# Patient Record
Sex: FEMALE | Race: WHITE | NOT HISPANIC OR LATINO | Employment: UNEMPLOYED | URBAN - NONMETROPOLITAN AREA
[De-identification: names, ages, dates, MRNs, and addresses within clinical notes are randomized per-mention and may not be internally consistent; named-entity substitution may affect disease eponyms.]

---

## 2019-03-24 ENCOUNTER — APPOINTMENT (OUTPATIENT)
Dept: GENERAL RADIOLOGY | Facility: HOSPITAL | Age: 29
End: 2019-03-24

## 2019-03-24 ENCOUNTER — HOSPITAL ENCOUNTER (EMERGENCY)
Facility: HOSPITAL | Age: 29
Discharge: HOME | End: 2019-03-24
Admitting: STUDENT IN AN ORGANIZED HEALTH CARE EDUCATION/TRAINING PROGRAM

## 2019-03-24 VITALS
DIASTOLIC BLOOD PRESSURE: 68 MMHG | OXYGEN SATURATION: 98 % | TEMPERATURE: 99.6 F | BODY MASS INDEX: 17.5 KG/M2 | RESPIRATION RATE: 18 BRPM | SYSTOLIC BLOOD PRESSURE: 107 MMHG | WEIGHT: 89.13 LBS | HEART RATE: 94 BPM | HEIGHT: 60 IN

## 2019-03-24 DIAGNOSIS — J18.9 PNEUMONIA OF RIGHT UPPER LOBE DUE TO INFECTIOUS ORGANISM: Primary | ICD-10-CM

## 2019-03-24 DIAGNOSIS — R11.2 NON-INTRACTABLE VOMITING WITH NAUSEA, UNSPECIFIED VOMITING TYPE: ICD-10-CM

## 2019-03-24 LAB
ANION GAP SERPL CALCULATED.3IONS-SCNC: 11 MMOL/L (ref 5–15)
BASOPHILS # BLD AUTO: 0.06 10*3/MM3 (ref 0–0.2)
BASOPHILS NFR BLD AUTO: 0.5 % (ref 0–1.5)
BUN BLD-MCNC: 7 MG/DL (ref 7–21)
BUN/CREAT SERPL: 14.9 (ref 7–25)
CALCIUM SPEC-SCNC: 8.9 MG/DL (ref 8.4–10.2)
CHLORIDE SERPL-SCNC: 98 MMOL/L (ref 95–110)
CO2 SERPL-SCNC: 26 MMOL/L (ref 22–31)
CREAT BLD-MCNC: 0.47 MG/DL (ref 0.5–1)
DEPRECATED RDW RBC AUTO: 36.4 FL (ref 37–54)
EOSINOPHIL # BLD AUTO: 0.03 10*3/MM3 (ref 0–0.4)
EOSINOPHIL NFR BLD AUTO: 0.3 % (ref 0.3–6.2)
ERYTHROCYTE [DISTWIDTH] IN BLOOD BY AUTOMATED COUNT: 11.6 % (ref 12.3–15.4)
GFR SERPL CREATININE-BSD FRML MDRD: 159 ML/MIN/1.73 (ref 71–165)
GLUCOSE BLD-MCNC: 87 MG/DL (ref 60–100)
HCG SERPL QL: NEGATIVE
HCT VFR BLD AUTO: 36.6 % (ref 34–46.6)
HGB BLD-MCNC: 12.9 G/DL (ref 12–15.9)
HOLD SPECIMEN: NORMAL
IMM GRANULOCYTES # BLD AUTO: 0.03 10*3/MM3 (ref 0–0.05)
IMM GRANULOCYTES NFR BLD AUTO: 0.3 % (ref 0–0.5)
LIPASE SERPL-CCNC: 50 U/L (ref 23–300)
LYMPHOCYTES # BLD AUTO: 1.24 10*3/MM3 (ref 0.7–3.1)
LYMPHOCYTES NFR BLD AUTO: 10.6 % (ref 19.6–45.3)
MCH RBC QN AUTO: 30.4 PG (ref 26.6–33)
MCHC RBC AUTO-ENTMCNC: 35.2 G/DL (ref 31.5–35.7)
MCV RBC AUTO: 86.1 FL (ref 79–97)
MONOCYTES # BLD AUTO: 1.06 10*3/MM3 (ref 0.1–0.9)
MONOCYTES NFR BLD AUTO: 9 % (ref 5–12)
NEUTROPHILS # BLD AUTO: 9.31 10*3/MM3 (ref 1.4–7)
NEUTROPHILS NFR BLD AUTO: 79.3 % (ref 42.7–76)
NRBC BLD AUTO-RTO: 0 /100 WBC (ref 0–0)
PLATELET # BLD AUTO: 190 10*3/MM3 (ref 140–450)
PMV BLD AUTO: 10.7 FL (ref 6–12)
POTASSIUM BLD-SCNC: 3.7 MMOL/L (ref 3.5–5.1)
RBC # BLD AUTO: 4.25 10*6/MM3 (ref 3.77–5.28)
SODIUM BLD-SCNC: 135 MMOL/L (ref 137–145)
WBC NRBC COR # BLD: 11.73 10*3/MM3 (ref 3.4–10.8)
WHOLE BLOOD HOLD SPECIMEN: NORMAL

## 2019-03-24 PROCEDURE — 85025 COMPLETE CBC W/AUTO DIFF WBC: CPT | Performed by: STUDENT IN AN ORGANIZED HEALTH CARE EDUCATION/TRAINING PROGRAM

## 2019-03-24 PROCEDURE — 96374 THER/PROPH/DIAG INJ IV PUSH: CPT

## 2019-03-24 PROCEDURE — 99284 EMERGENCY DEPT VISIT MOD MDM: CPT

## 2019-03-24 PROCEDURE — 25010000002 ONDANSETRON PER 1 MG: Performed by: STUDENT IN AN ORGANIZED HEALTH CARE EDUCATION/TRAINING PROGRAM

## 2019-03-24 PROCEDURE — 83690 ASSAY OF LIPASE: CPT | Performed by: STUDENT IN AN ORGANIZED HEALTH CARE EDUCATION/TRAINING PROGRAM

## 2019-03-24 PROCEDURE — 71046 X-RAY EXAM CHEST 2 VIEWS: CPT

## 2019-03-24 PROCEDURE — 84703 CHORIONIC GONADOTROPIN ASSAY: CPT | Performed by: STUDENT IN AN ORGANIZED HEALTH CARE EDUCATION/TRAINING PROGRAM

## 2019-03-24 PROCEDURE — 80048 BASIC METABOLIC PNL TOTAL CA: CPT | Performed by: STUDENT IN AN ORGANIZED HEALTH CARE EDUCATION/TRAINING PROGRAM

## 2019-03-24 RX ORDER — AZITHROMYCIN 250 MG/1
250 TABLET, FILM COATED ORAL DAILY
Qty: 4 TABLET | Refills: 0 | Status: SHIPPED | OUTPATIENT
Start: 2019-03-25 | End: 2019-03-29

## 2019-03-24 RX ORDER — SODIUM CHLORIDE 0.9 % (FLUSH) 0.9 %
10 SYRINGE (ML) INJECTION AS NEEDED
Status: DISCONTINUED | OUTPATIENT
Start: 2019-03-24 | End: 2019-03-24 | Stop reason: HOSPADM

## 2019-03-24 RX ORDER — AZITHROMYCIN 250 MG/1
500 TABLET, FILM COATED ORAL ONCE
Status: COMPLETED | OUTPATIENT
Start: 2019-03-24 | End: 2019-03-24

## 2019-03-24 RX ORDER — ONDANSETRON 2 MG/ML
4 INJECTION INTRAMUSCULAR; INTRAVENOUS ONCE
Status: COMPLETED | OUTPATIENT
Start: 2019-03-24 | End: 2019-03-24

## 2019-03-24 RX ADMIN — AZITHROMYCIN 500 MG: 250 TABLET, FILM COATED ORAL at 15:10

## 2019-03-24 RX ADMIN — ONDANSETRON 4 MG: 2 INJECTION INTRAMUSCULAR; INTRAVENOUS at 14:46

## 2019-03-24 RX ADMIN — SODIUM CHLORIDE 1000 ML: 900 INJECTION, SOLUTION INTRAVENOUS at 14:46

## 2019-06-23 ENCOUNTER — HOSPITAL ENCOUNTER (EMERGENCY)
Facility: HOSPITAL | Age: 29
Discharge: HOME | End: 2019-06-23
Attending: EMERGENCY MEDICINE | Admitting: EMERGENCY MEDICINE

## 2019-06-23 VITALS
DIASTOLIC BLOOD PRESSURE: 80 MMHG | RESPIRATION RATE: 18 BRPM | HEART RATE: 75 BPM | WEIGHT: 94.6 LBS | SYSTOLIC BLOOD PRESSURE: 124 MMHG | HEIGHT: 60 IN | BODY MASS INDEX: 18.57 KG/M2 | OXYGEN SATURATION: 100 % | TEMPERATURE: 98.6 F

## 2019-06-23 DIAGNOSIS — N93.9 VAGINAL BLEEDING: Primary | ICD-10-CM

## 2019-06-23 LAB
B-HCG UR QL: NEGATIVE
BACTERIA UR QL AUTO: ABNORMAL /HPF
BILIRUB UR QL STRIP: NEGATIVE
CLARITY UR: ABNORMAL
COLOR UR: YELLOW
GLUCOSE UR STRIP-MCNC: NEGATIVE MG/DL
HGB UR QL STRIP.AUTO: ABNORMAL
HYALINE CASTS UR QL AUTO: ABNORMAL /LPF
KETONES UR QL STRIP: NEGATIVE
LEUKOCYTE ESTERASE UR QL STRIP.AUTO: NEGATIVE
NITRITE UR QL STRIP: NEGATIVE
PH UR STRIP.AUTO: 7.5 [PH] (ref 5–9)
PROT UR QL STRIP: NEGATIVE
RBC # UR: ABNORMAL /HPF
REF LAB TEST METHOD: ABNORMAL
SP GR UR STRIP: 1.01 (ref 1–1.03)
SQUAMOUS #/AREA URNS HPF: ABNORMAL /HPF
UROBILINOGEN UR QL STRIP: ABNORMAL
WBC UR QL AUTO: ABNORMAL /HPF

## 2019-06-23 PROCEDURE — 99283 EMERGENCY DEPT VISIT LOW MDM: CPT

## 2019-06-23 PROCEDURE — 81025 URINE PREGNANCY TEST: CPT | Performed by: EMERGENCY MEDICINE

## 2019-06-23 PROCEDURE — 81001 URINALYSIS AUTO W/SCOPE: CPT | Performed by: EMERGENCY MEDICINE

## 2019-08-27 ENCOUNTER — INITIAL PRENATAL (OUTPATIENT)
Dept: OBSTETRICS AND GYNECOLOGY | Facility: CLINIC | Age: 29
End: 2019-08-27

## 2019-08-27 ENCOUNTER — APPOINTMENT (OUTPATIENT)
Dept: LAB | Facility: HOSPITAL | Age: 29
End: 2019-08-27

## 2019-08-27 DIAGNOSIS — Z32.01 PREGNANCY TEST PERFORMED, PREGNANCY CONFIRMED: Primary | ICD-10-CM

## 2019-08-27 DIAGNOSIS — O99.330 TOBACCO USE DURING PREGNANCY, ANTEPARTUM: ICD-10-CM

## 2019-08-27 DIAGNOSIS — Z34.80 PRENATAL CARE, SUBSEQUENT PREGNANCY, UNSPECIFIED TRIMESTER: ICD-10-CM

## 2019-08-27 DIAGNOSIS — Z32.00 PREGNANCY EXAMINATION OR TEST, PREGNANCY UNCONFIRMED: ICD-10-CM

## 2019-08-27 DIAGNOSIS — O21.9 NAUSEA AND VOMITING DURING PREGNANCY PRIOR TO 22 WEEKS GESTATION: ICD-10-CM

## 2019-08-27 DIAGNOSIS — Z36.87 ENCOUNTER FOR ANTENATAL SCREENING FOR UNCERTAIN DATES: ICD-10-CM

## 2019-08-27 LAB
ABO GROUP BLD: NORMAL
AMPHET+METHAMPHET UR QL: NEGATIVE
AMPHETAMINES UR QL: NEGATIVE
B-HCG UR QL: POSITIVE
BARBITURATES UR QL SCN: NEGATIVE
BASOPHILS # BLD AUTO: 0.07 10*3/MM3 (ref 0–0.2)
BASOPHILS NFR BLD AUTO: 1.1 % (ref 0–1.5)
BENZODIAZ UR QL SCN: NEGATIVE
BILIRUB UR QL STRIP: NEGATIVE
BLD GP AB SCN SERPL QL: NEGATIVE
BUPRENORPHINE SERPL-MCNC: NEGATIVE NG/ML
CANNABINOIDS SERPL QL: NEGATIVE
CLARITY UR: ABNORMAL
COCAINE UR QL: NEGATIVE
COLOR UR: YELLOW
DEPRECATED RDW RBC AUTO: 37.2 FL (ref 37–54)
EOSINOPHIL # BLD AUTO: 0.03 10*3/MM3 (ref 0–0.4)
EOSINOPHIL NFR BLD AUTO: 0.5 % (ref 0.3–6.2)
ERYTHROCYTE [DISTWIDTH] IN BLOOD BY AUTOMATED COUNT: 12.2 % (ref 12.3–15.4)
GLUCOSE UR STRIP-MCNC: NEGATIVE MG/DL
HBV SURFACE AG SERPL QL IA: NORMAL
HCT VFR BLD AUTO: 36.5 % (ref 34–46.6)
HCV AB SER DONR QL: NORMAL
HGB BLD-MCNC: 12.4 G/DL (ref 12–15.9)
HGB UR QL STRIP.AUTO: NEGATIVE
HIV1+2 AB SER QL: NORMAL
IMM GRANULOCYTES # BLD AUTO: 0.01 10*3/MM3 (ref 0–0.05)
IMM GRANULOCYTES NFR BLD AUTO: 0.2 % (ref 0–0.5)
INTERNAL NEGATIVE CONTROL: NEGATIVE
INTERNAL POSITIVE CONTROL: POSITIVE
KETONES UR QL STRIP: NEGATIVE
LEUKOCYTE ESTERASE UR QL STRIP.AUTO: NEGATIVE
LYMPHOCYTES # BLD AUTO: 1.83 10*3/MM3 (ref 0.7–3.1)
LYMPHOCYTES NFR BLD AUTO: 28.1 % (ref 19.6–45.3)
Lab: ABNORMAL
Lab: NORMAL
MCH RBC QN AUTO: 29 PG (ref 26.6–33)
MCHC RBC AUTO-ENTMCNC: 34 G/DL (ref 31.5–35.7)
MCV RBC AUTO: 85.3 FL (ref 79–97)
METHADONE UR QL SCN: NEGATIVE
MONOCYTES # BLD AUTO: 0.41 10*3/MM3 (ref 0.1–0.9)
MONOCYTES NFR BLD AUTO: 6.3 % (ref 5–12)
NEUTROPHILS # BLD AUTO: 4.16 10*3/MM3 (ref 1.7–7)
NEUTROPHILS NFR BLD AUTO: 63.8 % (ref 42.7–76)
NITRITE UR QL STRIP: NEGATIVE
NRBC BLD AUTO-RTO: 0 /100 WBC (ref 0–0.2)
OPIATES UR QL: NEGATIVE
OXYCODONE UR QL SCN: NEGATIVE
PCP UR QL SCN: NEGATIVE
PH UR STRIP.AUTO: 6.5 [PH] (ref 5–8)
PLATELET # BLD AUTO: 216 10*3/MM3 (ref 140–450)
PMV BLD AUTO: 11 FL (ref 6–12)
PROPOXYPH UR QL: NEGATIVE
PROT UR QL STRIP: NEGATIVE
RBC # BLD AUTO: 4.28 10*6/MM3 (ref 3.77–5.28)
RH BLD: POSITIVE
RPR SER QL: NORMAL
RUBV IGG SERPL IA-ACNC: POSITIVE
SP GR UR STRIP: 1.02 (ref 1–1.03)
TRICYCLICS UR QL SCN: NEGATIVE
UROBILINOGEN UR QL STRIP: ABNORMAL
WBC NRBC COR # BLD: 6.51 10*3/MM3 (ref 3.4–10.8)

## 2019-08-27 PROCEDURE — 87086 URINE CULTURE/COLONY COUNT: CPT | Performed by: NURSE PRACTITIONER

## 2019-08-27 PROCEDURE — 81003 URINALYSIS AUTO W/O SCOPE: CPT | Performed by: NURSE PRACTITIONER

## 2019-08-27 PROCEDURE — 87661 TRICHOMONAS VAGINALIS AMPLIF: CPT | Performed by: NURSE PRACTITIONER

## 2019-08-27 PROCEDURE — 0501F PRENATAL FLOW SHEET: CPT | Performed by: NURSE PRACTITIONER

## 2019-08-27 PROCEDURE — 81025 URINE PREGNANCY TEST: CPT | Performed by: NURSE PRACTITIONER

## 2019-08-27 PROCEDURE — 86803 HEPATITIS C AB TEST: CPT | Performed by: NURSE PRACTITIONER

## 2019-08-27 PROCEDURE — 36415 COLL VENOUS BLD VENIPUNCTURE: CPT | Performed by: NURSE PRACTITIONER

## 2019-08-27 PROCEDURE — 87491 CHLMYD TRACH DNA AMP PROBE: CPT | Performed by: NURSE PRACTITIONER

## 2019-08-27 PROCEDURE — 87591 N.GONORRHOEAE DNA AMP PROB: CPT | Performed by: NURSE PRACTITIONER

## 2019-08-27 PROCEDURE — 80081 OBSTETRIC PANEL INC HIV TSTG: CPT | Performed by: NURSE PRACTITIONER

## 2019-08-27 PROCEDURE — 80306 DRUG TEST PRSMV INSTRMNT: CPT | Performed by: NURSE PRACTITIONER

## 2019-08-27 RX ORDER — PRENATAL VIT/IRON FUM/FOLIC AC 27 MG-1 MG
1 TABLET ORAL DAILY
Qty: 30 EACH | Refills: 11 | Status: SHIPPED | OUTPATIENT
Start: 2019-08-27 | End: 2019-09-26

## 2019-08-27 RX ORDER — ONDANSETRON 4 MG/1
4 TABLET, FILM COATED ORAL EVERY 8 HOURS PRN
Qty: 30 TABLET | Refills: 3 | Status: SHIPPED | OUTPATIENT
Start: 2019-08-27 | End: 2021-10-06

## 2019-08-28 LAB
BACTERIA SPEC AEROBE CULT: NO GROWTH
C TRACH RRNA CVX QL NAA+PROBE: NEGATIVE
N GONORRHOEA RRNA SPEC QL NAA+PROBE: NEGATIVE
TRICHOMONAS VAGINALIS PCR: NEGATIVE

## 2019-09-02 PROBLEM — O99.330 TOBACCO USE DURING PREGNANCY, ANTEPARTUM: Status: ACTIVE | Noted: 2019-09-02

## 2019-09-02 PROBLEM — O21.9 NAUSEA AND VOMITING DURING PREGNANCY PRIOR TO 22 WEEKS GESTATION: Status: ACTIVE | Noted: 2019-09-02

## 2019-09-03 NOTE — PROGRESS NOTES
CC: NOB visit, hx reviewed     Patient Active Problem List   Diagnosis   • Tobacco use during pregnancy, antepartum   • Nausea and vomiting during pregnancy prior to 22 weeks gestation       HPI: 28 y.o. . Pt denies any chronic medical issues.  Surgical history includes wisdom teeth extraction.  Current medications prenatal vitamins.  Current everyday tobacco smoker, 0.5 ppd.  Pt denies drug use.    GA: 10w2d DELIA: 3/28/2020, by Last Menstrual Period c/w CRL dating scan on 19    ROS: +n/v.  Pt denies cramping, dysuria, or vaginal bleeding.      Labs:   No results found for: HGBA1C  Glucose   Date Value Ref Range Status   2019 87 60 - 100 mg/dL Final     Last Completed Pap Smear       Status Date      PAP SMEAR Pt declines pap today, will plan on doing 6 week pp          Radiology: bedside TAUS performed with assistance from AC Bills- single intrauterine pregnancy,  +FM,     Each of the above were reviewed and integrated into prenatal care plan.    P/E:  See Vitals flow sheet, BP 88/52, see NOB physical in episode tab    1. Routine prenatal care   Encourage PNV   SAB precautions   NOB labs   First trimester teaching completed, pt verbalized understanding.     Pt declines NIPT     2.    Diagnosis Plan   1. Pregnancy test performed, pregnancy confirmed  OB Panel With HIV    Urinalysis without microscopic (no culture) - Urine, Clean Catch    Urine Culture - Urine, Urine, Clean Catch    Urine Drug Screen - Urine, Clean Catch    Chlamydia trachomatis, Neisseria gonorrhoeae, Trichomonas vaginalis, PCR - Urine, Urine, Clean Catch   2. Pregnancy examination or test, pregnancy unconfirmed  OB Panel With HIV    Urinalysis without microscopic (no culture) - Urine, Clean Catch    Urine Culture - Urine, Urine, Clean Catch    Urine Drug Screen - Urine, Clean Catch    Chlamydia trachomatis, Neisseria gonorrhoeae, Trichomonas vaginalis, PCR - Urine, Urine, Clean Catch    Prenatal Vit-Fe Fumarate-FA  (PRENATAL/FOLIC ACID) tablet    POC Pregnancy, Urine   3. Prenatal care, subsequent pregnancy, unspecified trimester  OB Panel With HIV    Urinalysis without microscopic (no culture) - Urine, Clean Catch    Urine Culture - Urine, Urine, Clean Catch    Urine Drug Screen - Urine, Clean Catch    Chlamydia trachomatis, Neisseria gonorrhoeae, Trichomonas vaginalis, PCR - Urine, Urine, Clean Catch   4. Encounter for  screening for uncertain dates  US Ob Transvaginal  No longer indicated TAUS performed CRL c/w LMP   5. Nausea and vomiting during pregnancy prior to 22 weeks gestation  ondansetron (ZOFRAN) 4 MG tablet  Dry crackers before arising in am, small frequent meals, zofran prn    6. Tobacco use during pregnancy, antepartum  Pt educated on risks, strongly encouraged complete smoking cessation

## 2019-09-24 ENCOUNTER — ROUTINE PRENATAL (OUTPATIENT)
Dept: OBSTETRICS AND GYNECOLOGY | Facility: CLINIC | Age: 29
End: 2019-09-24

## 2019-09-24 VITALS — DIASTOLIC BLOOD PRESSURE: 58 MMHG | BODY MASS INDEX: 17.97 KG/M2 | WEIGHT: 92 LBS | SYSTOLIC BLOOD PRESSURE: 98 MMHG

## 2019-09-24 DIAGNOSIS — M25.559 PREGNANCY RELATED HIP PAIN IN FIRST TRIMESTER, ANTEPARTUM: ICD-10-CM

## 2019-09-24 DIAGNOSIS — Z36.89 ENCOUNTER FOR FETAL ANATOMIC SURVEY: ICD-10-CM

## 2019-09-24 DIAGNOSIS — O99.330 TOBACCO USE DURING PREGNANCY, ANTEPARTUM: ICD-10-CM

## 2019-09-24 DIAGNOSIS — O26.891 PREGNANCY RELATED HIP PAIN IN FIRST TRIMESTER, ANTEPARTUM: ICD-10-CM

## 2019-09-24 DIAGNOSIS — O21.9 NAUSEA AND VOMITING DURING PREGNANCY PRIOR TO 22 WEEKS GESTATION: ICD-10-CM

## 2019-09-24 DIAGNOSIS — Z86.69 HISTORY OF SCIATICA: ICD-10-CM

## 2019-09-24 DIAGNOSIS — Z64.1 MULTIGRAVIDA: ICD-10-CM

## 2019-09-24 DIAGNOSIS — Z3A.13 13 WEEKS GESTATION OF PREGNANCY: Primary | ICD-10-CM

## 2019-09-24 PROCEDURE — 0502F SUBSEQUENT PRENATAL CARE: CPT | Performed by: NURSE PRACTITIONER

## 2019-09-24 NOTE — PROGRESS NOTES
CC: Prenatal visit    Christiana Aguirre is a 28 y.o.  at 13w3d. Pt reports in previous pregnancy she had a lot if right hip and sciatica pain and is concerned this will be an issue again.  She is also concerned that her blood pressure is too low.  Educated pt on blood pressure changes in pregnancy and discussed changing positions slowing and consuming plenty of water daily.  I referred pt to Faye Buitrago, PT for evaluation.  Denies contractions, LOF, or VB.      BP 98/58   Wt 41.7 kg (92 lb)   LMP 2019   BMI 17.97 kg/m²           4th pregnancy Problems (from 19 to present)     Problem Noted Resolved    Multigravida 2019 by Samra Hayes APRN No          A/P: Christiana Aguirre is a 28 y.o.  at 13w3d.  - RTC in 1 month for LAUREEN appt and anatomy scan or sooner if needed      Diagnosis Plan   1. 13 weeks gestation of pregnancy     2. Pregnancy related hip pain in first trimester, antepartum  Ambulatory Referral to Physical Therapy Evaluate and treat   3. History of sciatica  Ambulatory Referral to Physical Therapy Evaluate and treat   4. Encounter for fetal anatomic survey  US Ob Detail Fetal Anatomy Single or First Gestation   5. Multigravida     6. Tobacco use during pregnancy, antepartum     7. Nausea and vomiting during pregnancy prior to 22 weeks gestation  Continue small frequent meals, AC Gonzalez  2019  11:27 AM

## 2019-10-29 ENCOUNTER — ROUTINE PRENATAL (OUTPATIENT)
Dept: OBSTETRICS AND GYNECOLOGY | Facility: CLINIC | Age: 29
End: 2019-10-29

## 2019-10-29 VITALS — WEIGHT: 99 LBS | DIASTOLIC BLOOD PRESSURE: 60 MMHG | SYSTOLIC BLOOD PRESSURE: 96 MMHG | BODY MASS INDEX: 19.33 KG/M2

## 2019-10-29 DIAGNOSIS — Z64.1 MULTIGRAVIDA: ICD-10-CM

## 2019-10-29 DIAGNOSIS — O09.92 HIGH-RISK PREGNANCY, SECOND TRIMESTER: ICD-10-CM

## 2019-10-29 DIAGNOSIS — O44.20 MARGINAL PLACENTA PREVIA: ICD-10-CM

## 2019-10-29 DIAGNOSIS — M25.551 PAIN OF RIGHT HIP JOINT: ICD-10-CM

## 2019-10-29 DIAGNOSIS — Z23 INFLUENZA VACCINE ADMINISTERED: Primary | ICD-10-CM

## 2019-10-29 DIAGNOSIS — M54.31 SCIATICA OF RIGHT SIDE: Primary | ICD-10-CM

## 2019-10-29 DIAGNOSIS — O43.192 MARGINAL INSERTION OF UMBILICAL CORD AFFECTING MANAGEMENT OF MOTHER IN SECOND TRIMESTER: ICD-10-CM

## 2019-10-29 DIAGNOSIS — M54.31 RIGHT SIDED SCIATICA: ICD-10-CM

## 2019-10-29 PROCEDURE — 90674 CCIIV4 VAC NO PRSV 0.5 ML IM: CPT | Performed by: NURSE PRACTITIONER

## 2019-10-29 PROCEDURE — 90471 IMMUNIZATION ADMIN: CPT | Performed by: NURSE PRACTITIONER

## 2019-10-29 PROCEDURE — 0502F SUBSEQUENT PRENATAL CARE: CPT | Performed by: NURSE PRACTITIONER

## 2019-10-29 NOTE — PROGRESS NOTES
CC: Prenatal visit    Christiana Aguirre is a 28 y.o.  at 18w3d.  Denies contractions, LOF, or VB.  Reports good FM. Has not heard back regarding PT referral. Pt c/o of right sided sciatic pain;  Right leg loses feeling daily for 10-15 seconds and is unable to put pressure on her right leg when it occurs. States she is a daily  at [employer] and pain starting to impend her work.     BP 96/60   Wt 44.9 kg (99 lb)   LMP 2019   BMI 19.33 kg/m²      Preliminary Anatomy US:  Cephalic. Placenta posterior marginal previa and marginal insertion of the cord. Marginal placenta cord insertion measures 3.0 cm from edge of placenta. Leading edge of placenta 1.5 cm from internal os. Thorax with echogenic focus.   gm/ 9 oz.  3 VC. Cervix 4.8 cm.  AFV WNL. Suboptimal views remain.     Fetal Heart Rate: 163    4th pregnancy Problems (from 19 to present)     Problem Noted Resolved    Right sided sciatica 10/29/2019 by Daisha Johnston APRN No    Overview Signed 10/29/2019  4:14 PM by Daisha Johnston APRN     Physical Therapy referral amended to Faye Buitrago DPT.          Marginal insertion of umbilical cord affecting management of mother in second trimester 10/29/2019 by Daisha Johnston APRN No    Overview Signed 10/29/2019  4:17 PM by Daisha Johnston APRN     Marginal placenta cord insertion measures 3.0 cm from edge of placenta 10/29/19         Marginal placenta previa 10/29/2019 by Daisha Johnston APRN No    Overview Signed 10/29/2019  4:18 PM by Daisha Johnston APRN     Leading edge of placenta 1.5 cm from internal os.  Counseled on pelvic rest.          High-risk pregnancy, second trimester 10/29/2019 by Daisha Johnston APRN No    Overview Addendum 10/29/2019  4:32 PM by Daisha Johnston APRN     A positive / neg antibody / Rubella Immune / GBS unknown  Dating: DELIA: 3/28/2020 by LMP c/w CRL dating scan on 19  Genetics: Declines  Tdap: 28 wks  Flu: Given  10/29  Anatomy: 10/29/19-marginal insertion of cord, posterior marginal previa, echogenic focus-thorax.  1hr Glucola: @ 28 wk  H&H/Plts: @ 28 wk  H&H/Plts: 12.4 / 36.5%/ 216 on 19.   Breastfeeding:?  BC: ?         Multigravida 2019 by Samra Hayes APRN No    Tobacco use during pregnancy, antepartum 2019 by Samra Hayes APRN No    Overview Signed 10/29/2019 10:19 AM by Daisha Johnston APRN     10 cigarettes a day; decreased from a pack a day         Nausea and vomiting during pregnancy prior to 22 weeks gestation 2019 by Samra Hayes APRN No    Overview Signed 10/29/2019  4:13 PM by Daisha Johnston APRN     Stable               A/P: Christiana Aguirre is a 28 y.o.  at 18w3d.  - RTC in 3 weeks for LAUREEN and repeat U/S.     Diagnosis Plan   1. Influenza vaccine administered  Influenza Vac Subunit Quad (FLUCELVAX) injection 0.5 mL   2. Multigravida     3. Right sided sciatica  Pt has an apt with ALFRED Mckinney 19   4. Marginal insertion of umbilical cord affecting management of mother in second trimester     5. Marginal placenta previa  Counseled on pelvic rest.    6. High-risk pregnancy, second trimester       AC Fong  10/29/2019  4:47 PM

## 2019-11-06 DIAGNOSIS — Z36.89 ENCOUNTER FOR FETAL ANATOMIC SURVEY: ICD-10-CM

## 2019-11-06 DIAGNOSIS — Z36.87 ENCOUNTER FOR ANTENATAL SCREENING FOR UNCERTAIN DATES: ICD-10-CM

## 2019-11-07 ENCOUNTER — HOSPITAL ENCOUNTER (OUTPATIENT)
Dept: PHYSICAL THERAPY | Facility: HOSPITAL | Age: 29
Setting detail: THERAPIES SERIES
Discharge: HOME | End: 2019-11-07

## 2019-11-07 DIAGNOSIS — M25.551 PAIN IN JOINT OF RIGHT HIP: ICD-10-CM

## 2019-11-07 DIAGNOSIS — M54.31 RIGHT SIDED SCIATICA: Primary | ICD-10-CM

## 2019-11-07 PROCEDURE — 97110 THERAPEUTIC EXERCISES: CPT | Performed by: PHYSICAL THERAPIST

## 2019-11-07 PROCEDURE — 97162 PT EVAL MOD COMPLEX 30 MIN: CPT | Performed by: PHYSICAL THERAPIST

## 2019-11-08 NOTE — THERAPY TREATMENT NOTE
Outpatient Physical Therapy Pelvic Health Treatment Note  [facility]     Patient Name: Christiana Aguirre  : 1990  MRN: [id number]  Today's Date: 2019        Visit Date: 2019   Visit number:   Recheck: 19      Visit Dx:    ICD-10-CM ICD-9-CM   1. Right sided sciatica M54.31 724.3   2. Pain in joint of right hip M25.551 719.45       Patient Active Problem List   Diagnosis   • Tobacco use during pregnancy, antepartum   • Nausea and vomiting during pregnancy prior to 22 weeks gestation   • Multigravida   • Right sided sciatica   • Marginal insertion of umbilical cord affecting management of mother in second trimester   • Marginal placenta previa   • High-risk pregnancy, second trimester        Pelvic Health     Row Name 19 1500             Pregnancy Questions    Number of Pregnancies  4  -SW      Number of Miscarriages  1  -SW      Has the patient had an ?  No  -SW      Number of Children  2 6 and 3 yo  -SW      How many weeks pregnant are you?  20 weeks  -SW      Type of Previous Deliveries  Vaginal  -SW      Due Date  20  -SW        User Key  (r) = Recorded By, (t) = Taken By, (c) = Cosigned By    Initials Name Provider Type    SW Faye Buitrago, PT Physical Therapist        PT Ortho     Row Name 19 1500       Subjective Comments    Subjective Comments  20 weeks gestation  with current EDC of 3/20/20.  Onset of R sciatica almost always.  Double jointed hips so seems to hurt all the time.  Feels that it pops in/out of joint all the time, even before pregnancy.  Noted pain with pregnancy of last child, but seems to hurt way earlier than last.  Pain is intermittent.  Loses feeling in leg with walking and prolonged activity.  7-8/10 pain rating.  Best 1/10.  Lifting pressure off leg decreases pain.  No scoliosis history. Some level of radicular pain all the time.  Radiation of pain to foot on R side.  Leg gives way with walking when pain hits.   Works in  [employer] at [employer] 6-8 hour work shift 5 days per week.   -SW       Subjective Pain    Able to rate subjective pain?  yes  -    Pre-Treatment Pain Level  5  -    Post-Treatment Pain Level  3  -SW       Posture/Observations    Posture- WNL  Posture is WNL  -SW    Posture/Observations Comments  Gaits into clinic without Ad.  Equal step and stride bilaterally.  R IC elevated in standing position.  L shoulder elevated.   -SW       Quarter Clearing    Quarter Clearing  Lower Quarter Clearing  -SW       DTR- Lower Quarter Clearing    Patellar tendon (L2-4)  2- Normal response  -SW    Achilles tendon (S1-2)  2- Normal response  -SW       Neural Tension Signs- Lower Quarter Clearing    Slump  Right:;Positive  -SW    SLR  Negative  -SW       Sensory Screen for Light Touch- Lower Quarter Clearing    L1 (inguinal area)  Intact  -SW    L2 (anterior mid thigh)  Intact  -SW    L3 (distal anterior thigh)  Intact  -SW    L4 (medial lower leg/foot)  Intact  -SW    L5 (lateral lower leg/great toe)  Intact  -SW    S1 (bottom of foot)  Intact  -SW       Myotomal Screen- Lower Quarter Clearing    Hip flexion (L2)  5 (Normal)  -SW    Knee extension (L3)  5 (Normal)  -SW    Knee flexion (S2)  5 (Normal)  -SW       Lumbar ROM Screen- Lower Quarter Clearing    Lumbar Flexion  -- 50 % impaired with habitus  -SW    Lumbar Extension  Normal  -SW    Lumbar Lateral Flexion  Normal  -SW    Lumbar Rotation  Normal  -SW       SI/Hip Screen- Lower Quarter Clearing    ASIS compression  Negative  -SW    ASIS distraction  Negative  -SW    Onel's/Iftikhar's test  Right:;Positive  -SW    Pain in Blank's area  Right:;Positive  -SW       Special Tests/Palpation    Special Tests/Palpation  Lumbar/SI  -SW       Lumbosacral Accessory Motions    Lumbosacral Accessory Motions Tested?  Yes  -    PA glide- Sacral base  -- R base prominent  -SW    Innominate rotation  -- R ant rotation   -SW       Lumbar/SI Special Tests    Slump Test (Neural Tension)   Right:;Positive  -SW    SI Compression Test (SI Dysfunction)  Negative  -SW    SI Distraction Test (SI Dysfunction)  Negative  -SW    SANTIAGO (hip vs. SI Dysfunction)  Right:;Positive  -SW    FAIR Test (Piriformis Syndrome)  Right:;Positive  -SW    Sacral Spring Test (SI Dysfunction)  Right:;Positive  -SW       Special Lumbosacral Questions    Are you pregnant?  Yes  -SW    Do you have pain when standing on one leg?  Yes when standing on R side  -SW    Do you have pain going up/down stairs?  Yes  -SW      User Key  (r) = Recorded By, (t) = Taken By, (c) = Cosigned By    Initials Name Provider Type    Faye Anderson, PT Physical Therapist                     PT Assessment/Plan     Row Name 11/07/19 1800          PT Assessment    Functional Limitations  Limitation in home management;Limitations in community activities;Performance in work activities;Performance in self-care ADL;Performance in leisure activities  -SW     Impairments  Impaired muscle length;Impaired postural alignment;Pain;Muscle strength;Motor function;Poor body mechanics;Posture  -SW     Assessment Comments  Doe is a 27 yo female presenting with complaints of LBP in pregnancy.  She has experienced sciatica with previous pregnancies but seems more exaccerbated with this pregnancy.  She would benefit from skilled therapy to assist with alignment, dec pain and improved function.  -SW     Rehab Potential  Good  -SW     Patient/caregiver participated in establishment of treatment plan and goals  Yes  -SW     Patient would benefit from skilled therapy intervention  Yes  -SW        PT Plan    PT Frequency  1x/week  -SW     Predicted Duration of Therapy Intervention (Therapy Eval)  15 visits  -SW     PT Plan Comments  manual therapy, thera ex, stabilization, posture and body mechanics.  -SW       User Key  (r) = Recorded By, (t) = Taken By, (c) = Cosigned By    Initials Name Provider Type    Faye Anderson, PT Physical Therapist            OP  Exercises     Row Name 19 1500             Subjective Comments    Subjective Comments  20 weeks gestation  with current EDC of 3/20/20.  Onset of R sciatica almost always.  Double jointed hips so seems to hurt all the time.  Feels that it pops in/out of joint all the time, even before pregnancy.  Noted pain with pregnancy of last child, but seems to hurt way earlier than last.  Pain is intermittent.  Loses feeling in leg with walking and prolonged activity.  7-8/10 pain rating.  Best 1/10.  Lifting pressure off leg decreases pain.  No scoliosis history. Some level of radicular pain all the time.  Radiation of pain to foot on R side.  Leg gives way with walking when pain hits.   Works in [employer] at [employer] 6-8 hour work shift 5 days per week.   -SW         Subjective Pain    Able to rate subjective pain?  yes  -SW      Pre-Treatment Pain Level  5  -SW      Post-Treatment Pain Level  3  -SW         Exercise 1    Exercise Name 1  angry cat stretch   -SW      Reps 1  10  -SW      Time 1  5 sec  -SW         Exercise 2    Exercise Name 2  piriformis stretch   -SW      Reps 2  3  -SW      Time 2  30 sec  -SW        User Key  (r) = Recorded By, (t) = Taken By, (c) = Cosigned By    Initials Name Provider Type    Faye Anderson, PT Physical Therapist           Manual Rx (last 36 hours)      Manual Treatments     Row Name 19 1500             Manual Rx 1    Manual Rx 1 Location  sacral torsion R on L  -SW        User Key  (r) = Recorded By, (t) = Taken By, (c) = Cosigned By    Initials Name Provider Type    Faye Anderson PT Physical Therapist                    PT OP Goals     Row Name 19 1800          PT Short Term Goals    STG Date to Achieve  19  -SW     STG 1  independent with HEp for ROM and stretching  -SW     STG 1 Progress  New  -SW     STG 2  patient to present with improved alignment such that MET not required  -SW     STG 2 Progress  New  -SW     STG 3  pain is mild pre/post  treatment 3/10 or less indicating improved management with use of HEP and postural training  -SW     STG 3 Progress  New  -     STG 4  patient to demo floor to waist t/f as to simulate child carry and proper  without compromise to LB mechanics.   -SW     STG 4 Progress  New  -     STG 5  dec sciatica to be more localized in buttock region and out of LE  -SW     STG 5 Progress  New  -        Long Term Goals    LTG Date to Achieve  02/28/20  -     LTG 1  full painfree ROM to LS  -SW     LTG 1 Progress  New  -     LTG 2  centralized radicular s/s to buttock and LB region  -SW     LTG 2 Progress  New  -     LTG 3  70% better subjectively since induction of PT  -SW     LTG 3 Progress  New  -        Time Calculation    PT Goal Re-Cert Due Date  12/05/19  -       User Key  (r) = Recorded By, (t) = Taken By, (c) = Cosigned By    Initials Name Provider Type    Faye Anderson, PT Physical Therapist           Therapy Education  Given: HEP  Program: New  How Provided: Verbal, Demonstration, Written  Provided to: Patient  Level of Understanding: Teach back education performed, Verbalized, Demonstrated              Time Calculation:   Start Time: 1505  Stop Time: 1559  Time Calculation (min): 54 min  Therapy Charges for Today     Code Description Service Date Service Provider Modifiers Qty    77144959074 HC PT EVAL MOD COMPLEXITY 3 11/7/2019 Faye Buitrago, PT GP 1    [id number]  PT THER PROC EA 15 MIN 11/7/2019 Faye Buitrago, PT GP 1                    Faye Buitrago PT  11/7/2019

## 2019-11-14 ENCOUNTER — HOSPITAL ENCOUNTER (OUTPATIENT)
Dept: PHYSICAL THERAPY | Facility: HOSPITAL | Age: 29
Setting detail: THERAPIES SERIES
Discharge: HOME | End: 2019-11-14

## 2019-11-14 DIAGNOSIS — M25.551 PAIN IN JOINT OF RIGHT HIP: ICD-10-CM

## 2019-11-14 DIAGNOSIS — M54.31 RIGHT SIDED SCIATICA: Primary | ICD-10-CM

## 2019-11-14 PROCEDURE — 97110 THERAPEUTIC EXERCISES: CPT | Performed by: PHYSICAL THERAPIST

## 2019-11-14 NOTE — THERAPY TREATMENT NOTE
Outpatient Physical Therapy Pelvic Health Treatment Note  [facility]     Patient Name: Christiana Aguirre  : 1990  MRN: [id number]  Today's Date: 2019        Visit Date: 2019   Visit number:   Recheck: 19      Visit Dx:    ICD-10-CM ICD-9-CM   1. Right sided sciatica M54.31 724.3   2. Pain in joint of right hip M25.551 719.45       Patient Active Problem List   Diagnosis   • Tobacco use during pregnancy, antepartum   • Nausea and vomiting during pregnancy prior to 22 weeks gestation   • Multigravida   • Right sided sciatica   • Marginal insertion of umbilical cord affecting management of mother in second trimester   • Marginal placenta previa   • High-risk pregnancy, second trimester        Pelvic Health     Row Name 19 1600             Pregnancy Questions    Number of Pregnancies  4  -SW      Number of Miscarriages  1  -SW      Has the patient had an ?  No  -SW      Number of Children  2 6 and 3 yo  -SW      How many weeks pregnant are you?  21 weeks  -SW      Type of Previous Deliveries  Vaginal  -SW      Due Date  20  -SW         Lumbar/SI Special Tests    SI Compression Test (SI Dysfunction)  Negative  -SW      SI Distraction Test (SI Dysfunction)  Negative  -SW      SANTIAGO (hip vs. SI Dysfunction)  Right:;Positive  -SW         Lumbosacral Palpation    Lumbosacral Palpation Comments  No tenderness noted with palpation to LB.  Improved alignment noted to LS though slight elevation of sacral base on the R.  Non-symptomatic at this date at sacral base and more localized pain in R buttock, though non-tender to region with touch.   -SW         Lumbosacral Accessory Motions    Lumbosacral Accessory Motions Tested?  Yes  -SW        User Key  (r) = Recorded By, (t) = Taken By, (c) = Cosigned By    Initials Name Provider Type    SW Faye Buitrago, PT Physical Therapist        PT Ortho     Row Name 19 1600       Subjective Comments    Subjective Comments   Walking on uneven floor at work causes pain in leg.  constantly moving with job and required to walk across area all the time. Feel that it challenges her pain.  Angry cat stretch is difficult because fetus is low and causes her to pee.  But the other exercise does wonders.  Really helps to loosen up.   -       Subjective Pain    Able to rate subjective pain?  yes  -    Pre-Treatment Pain Level  3  -SW    Post-Treatment Pain Level  0  -SW       Posture/Observations    Posture- WNL  Posture is WNL  -SW    Posture/Observations Comments  nonantalgic gait. Equal step and stride noted bilaterally.  No difficulty with transfers noted. Manual alignment assessment showed improved symmetry at pelvic girdle supine.  Sacrum slightly prominent on R base, but no symptomatic with palpation.   -       Neural Tension Signs- Lower Quarter Clearing    Slump  Right:;Positive  -SW       Lumbosacral Accessory Motions    PA glide- Sacral base  -- more symmetry though slightly elevated on R  -SW    Innominate rotation  -- improved symmetry noted.   -SW       Lumbar/SI Special Tests    Slump Test (Neural Tension)  Right:;Positive  -SW    Lumbar/SI Special Tests Comments  Alignment presented improved this visit.  Continued with neural tension noted with testing to RLE.   -      User Key  (r) = Recorded By, (t) = Taken By, (c) = Cosigned By    Initials Name Provider Type    Faye Anderson, PT Physical Therapist                     PT Assessment/Plan     Row Name 11/14/19 1600          PT Assessment    Functional Limitations  Limitation in home management;Limitations in community activities;Performance in work activities;Performance in self-care ADL;Performance in leisure activities  -     Impairments  Impaired muscle length;Impaired postural alignment;Pain;Muscle strength;Motor function;Poor body mechanics;Posture  -SW     Assessment Comments  Extension program created centralization of s/s without inc pain or discomfort.   Post exercise, sciatica decreased and no pain reported.  All manual therapy held this visit to assess benefit with extension program.  Patient needs stabilization exercise training to support alignment with daily functions.  Overall good progress toward goals and reduction of pain this visit.   -SW     Rehab Potential  Good  -SW     Patient/caregiver participated in establishment of treatment plan and goals  Yes  -SW     Patient would benefit from skilled therapy intervention  Yes  -SW        PT Plan    PT Frequency  1x/week  -SW     Predicted Duration of Therapy Intervention (Therapy Eval)  15 visits  -SW     PT Plan Comments  continue with advancing stabilization exercises. Manual as indicated.   -       User Key  (r) = Recorded By, (t) = Taken By, (c) = Cosigned By    Initials Name Provider Type    SW Faye Buitrago, PT Physical Therapist            OP Exercises     Row Name 11/14/19 1600             Subjective Comments    Subjective Comments  Walking on uneven floor at work causes pain in leg.  constantly moving with job and required to walk across area all the time. Feel that it challenges her pain.  Angry cat stretch is difficult because fetus is low and causes her to pee.  But the other exercise does wonders.  Really helps to loosen up.   -SW         Subjective Pain    Able to rate subjective pain?  yes  -SW      Pre-Treatment Pain Level  3  -SW      Post-Treatment Pain Level  0  -SW         Exercise 1    Exercise Name 1  hamstring stretch bilaterally  -SW      Reps 1  3  -SW      Time 1  30 sec  -SW         Exercise 2    Exercise Name 2  adductor stretch bilaterally  -SW      Reps 2  3  -SW      Time 2  30 sec  -SW         Exercise 3    Exercise Name 3  piriformis stretch bilaterally   -SW      Reps 3  3  -SW      Time 3  30 sec  -SW         Exercise 4    Exercise Name 4  prone lying position  -SW      Reps 4  2  -SW      Time 4  3 minutes  -SW      Additional Comments  subjective reports of pain dec  intensity.    -SW         Exercise 5    Exercise Name 5  prone on elbows  -SW      Reps 5  2  -SW      Time 5  3 minutes  -SW      Additional Comments  resolved pressure and pain is gone with performance of this stretch .  -SW         Exercise 6    Exercise Name 6  prone press ups.  -SW      Sets 6  2  -SW      Reps 6  5  -SW      Time 6  5 sec  -SW      Additional Comments  complete resolution of symptoms in R buttock upon completion of this exercise.    -SW         Exercise 7    Exercise Name 7  modified angry cat (pelvic rock in seated position)  -SW      Reps 7  10  -SW      Additional Comments  patient reported inc complaints of angry cat performed in quadruped due to pressure to pelvis.  Modified to seated position with good tolerance.   -SW         Exercise 8    Exercise Name 8  review of body mechanics  -SW      Additional Comments  reviewed body mechanics for child carry and work related activities.  Encouraged log roll for home use as to not exaccerbate LB s/s.   -        User Key  (r) = Recorded By, (t) = Taken By, (c) = Cosigned By    Initials Name Provider Type    Faye Anderson, PT Physical Therapist                      PT OP Goals     Row Name 11/14/19 1600          PT Short Term Goals    STG Date to Achieve  12/05/19  -     STG 1  independent with HEp for ROM and stretching  -     STG 1 Progress  Ongoing;Progressing  -     STG 2  patient to present with improved alignment such that MET not required  -     STG 2 Progress  New  -     STG 3  pain is mild pre/post treatment 3/10 or less indicating improved management with use of HEP and postural training  -     STG 3 Progress  Progressing  -     STG 4  patient to demo floor to waist t/f as to simulate child carry and proper  without compromise to LB mechanics.   -     STG 4 Progress  New  -     STG 5  dec sciatica to be more localized in buttock region and out of LE  -SW     STG 5 Progress  New  -        Long Term  Goals    LTG Date to Achieve  02/28/20  -SW     LTG 1  full painfree ROM to LS  -SW     LTG 1 Progress  New  -SW     LTG 2  centralized radicular s/s to buttock and LB region  -SW     LTG 2 Progress  New  -SW     LTG 3  70% better subjectively since induction of PT  -SW     LTG 3 Progress  New  -SW        Time Calculation    PT Goal Re-Cert Due Date  12/05/19  -       User Key  (r) = Recorded By, (t) = Taken By, (c) = Cosigned By    Initials Name Provider Type    Faye Anderson, PT Physical Therapist           Therapy Education  Given: HEP  Program: Reinforced, Progressed  How Provided: Verbal, Demonstration  Provided to: Patient  Level of Understanding: Teach back education performed, Verbalized, Demonstrated              Time Calculation:   Start Time: 1602  Stop Time: 1655  Time Calculation (min): 53 min  Therapy Charges for Today     Code Description Service Date Service Provider Modifiers Qty    81987337387 HC PT THER PROC EA 15 MIN 11/14/2019 Faye Buitrago, PT GP 4                    Faye Buitrago, PT  11/14/2019

## 2019-11-19 ENCOUNTER — ROUTINE PRENATAL (OUTPATIENT)
Dept: OBSTETRICS AND GYNECOLOGY | Facility: CLINIC | Age: 29
End: 2019-11-19

## 2019-11-19 ENCOUNTER — HOSPITAL ENCOUNTER (OUTPATIENT)
Dept: PHYSICAL THERAPY | Facility: HOSPITAL | Age: 29
Setting detail: THERAPIES SERIES
Discharge: HOME | End: 2019-11-19

## 2019-11-19 VITALS — DIASTOLIC BLOOD PRESSURE: 60 MMHG | BODY MASS INDEX: 19.53 KG/M2 | WEIGHT: 100 LBS | SYSTOLIC BLOOD PRESSURE: 100 MMHG

## 2019-11-19 DIAGNOSIS — M54.31 RIGHT SIDED SCIATICA: Primary | ICD-10-CM

## 2019-11-19 DIAGNOSIS — Z3A.21 21 WEEKS GESTATION OF PREGNANCY: Primary | ICD-10-CM

## 2019-11-19 DIAGNOSIS — M25.551 PAIN IN JOINT OF RIGHT HIP: ICD-10-CM

## 2019-11-19 DIAGNOSIS — Z64.1 MULTIGRAVIDA: ICD-10-CM

## 2019-11-19 DIAGNOSIS — M54.31 RIGHT SIDED SCIATICA: ICD-10-CM

## 2019-11-19 DIAGNOSIS — O09.92 HIGH-RISK PREGNANCY, SECOND TRIMESTER: ICD-10-CM

## 2019-11-19 DIAGNOSIS — Z36.89 ENCOUNTER FOR OTHER SPECIFIED ANTENATAL SCREENING: ICD-10-CM

## 2019-11-19 DIAGNOSIS — O99.330 TOBACCO USE DURING PREGNANCY, ANTEPARTUM: ICD-10-CM

## 2019-11-19 PROCEDURE — 0502F SUBSEQUENT PRENATAL CARE: CPT | Performed by: NURSE PRACTITIONER

## 2019-11-19 PROCEDURE — 97110 THERAPEUTIC EXERCISES: CPT | Performed by: PHYSICAL THERAPIST

## 2019-11-19 NOTE — PROGRESS NOTES
CC: Prenatal visit    Christiana Aguirre is a 28 y.o.  at 21w3d by LMP.  Doing well.  No complaints.  Denies contractions, LOF, or VB.  Reports good FM. States sciatica pain has improved greatly with PT. States she has cut down to 6 cigarettes/ day from a whole pack this pregnancy.     /60   Wt 45.4 kg (100 lb)   LMP 2019   BMI 19.53 kg/m²     Fundal Height (cm): 21 cm  Fetal Heart Rate: 145 us   Preliminary OB repeat US:  AFV wnl; cephalic, posterior placenta; NO PREVIA & P. Cord insertion NORMAL APPEARANCE.  gm (1lb1oz), Remaining anatomy views normal in appearance, no echogenic focuses see today in 4ch. Cervix 3.61 cm. Reviewed findings with patient.     4th pregnancy Problems (from 19 to present)     Problem Noted Resolved    Right sided sciatica 10/29/2019 by Daisha Johnston APRN No    Overview Signed 10/29/2019  4:14 PM by Daisha Johnston APRN     Physical Therapy referral amended to Faye Buitrago DPT.          Marginal insertion of umbilical cord affecting management of mother in second trimester 10/29/2019 by Daisha Johnston APRN No    Overview Addendum 2019 10:06 AM by Daisha Johnston APRN     Resolved on US 2019         Marginal placenta previa 10/29/2019 by Daisha Johnston APRN No    Overview Addendum 2019 10:05 AM by Daisha Johnston APRN     Resolved; posterior placenta, no previa and cord insertion wnl on U/S on 2019             High-risk pregnancy, second trimester 10/29/2019 by Daisha Johnston APRN No    Overview Addendum 10/29/2019  4:32 PM by Daisha Johnston APRN     A positive / neg antibody / Rubella Immune / GBS unknown  Dating: DELIA: 3/28/2020 by LMP c/w CRL dating scan on 19  Genetics: Declines  Tdap: 28 wks  Flu: Given 10/29  Anatomy: 10/29/19-marginal insertion of cord, posterior marginal previa, echogenic focus-thorax.  1hr Glucola: @ 28 wk  H&H/Plts: @ 28 wk  H&H/Plts: 12.4 / 36.5%/ 216 on 19.    Breastfeeding:?  BC: ?         Multigravida 2019 by Samra Hayes APRN No    Tobacco use during pregnancy, antepartum 2019 by Samra Hayes APRN No    Overview Addendum 2019  9:52 AM by Daisha Johnston APRN     6 cigarettes a day; decreased from a pack a day;         Nausea and vomiting during pregnancy prior to 22 weeks gestation 2019 by Samra Hayes APRN No    Overview Signed 10/29/2019  4:13 PM by Daisha Johnston APRN     Stable               A/P: Christiana Aguirre is a 28 y.o.  at 21w3d.  -Glucola and CBC at next visit  -RTC in 4 weeks     Diagnosis Plan   1. 21 weeks gestation of pregnancy     2. Right sided sciatica     3. High-risk pregnancy, second trimester     4. Multigravida     5. Tobacco use during pregnancy, antepartum     6. Encounter for other specified  screening  Glucose, Post 50 Gm Glucola    CBC (No Diff)     AC Fong  2019  10:13 AM

## 2019-11-20 NOTE — THERAPY TREATMENT NOTE
Outpatient Physical Therapy Pelvic Health Treatment Note  [facility]     Patient Name: Christiana Aguirre  : 1990  MRN: [id number]  Today's Date: 2019        Visit Date: 2019  Visit Number: 3/3  Recheck: 19    Visit Dx:    ICD-10-CM ICD-9-CM   1. Right sided sciatica M54.31 724.3   2. Pain in joint of right hip M25.551 719.45       Patient Active Problem List   Diagnosis   • Tobacco use during pregnancy, antepartum   • Nausea and vomiting during pregnancy prior to 22 weeks gestation   • Multigravida   • Right sided sciatica   • Marginal insertion of umbilical cord affecting management of mother in second trimester   • Marginal placenta previa   • High-risk pregnancy, second trimester        Pelvic Health     Row Name 19 1000             Pregnancy Questions    Number of Pregnancies  4  -SW      Number of Miscarriages  1  -SW      Has the patient had an ?  No  -SW      Number of Children  2  -SW      How many weeks pregnant are you?  22 weeks  -SW      Type of Previous Deliveries  Vaginal  -SW      Due Date  19  -SW         Pain Assessment    Pain Assessment  No/denies pain  -SW         Lumbosacral Palpation    Lumbosacral Palpation Comments  Unremarkable exam this visit.   -SW        User Key  (r) = Recorded By, (t) = Taken By, (c) = Cosigned By    Initials Name Provider Type    Faye Anderson, PT Physical Therapist        PT Ortho     Row Name 19 1000       Subjective Comments    Subjective Comments  Has been to MD this morning.  Got another US this morning.  Femur bones of fetus are going to be long.  MD FU on  for glucose test.  Sciatica is good today.  No radicular s/s at all today.  Started a little on Thursday evening at work but has subsided since then. Really feeling good and feels that exercises are helping.   -SW       Subjective Pain    Able to rate subjective pain?  yes  -SW    Pre-Treatment Pain Level  0  -SW    Post-Treatment Pain  Level  0  -SW       Posture/Observations    Posture- WNL  Posture is WNL  -SW    Posture/Observations Comments  Gait without antalgia; good alignment this visit in supine.  Equal alignment noted bilaterally. Neg SLR and slump test noted.   -SW       Neural Tension Signs- Lower Quarter Clearing    Slump  Negative  -SW    SLR  Negative  -SW       Lumbar ROM Screen- Lower Quarter Clearing    Lumbar Flexion  Normal  -SW       Lumbosacral Accessory Motions    Lumbosacral Accessory Motions Tested?  Yes  -    PA glide- Sacral base  -- improved symmetry noted.   -    Innominate rotation  -- equal alignment noted this date.   -SW       Lumbar/SI Special Tests    SI Compression Test (SI Dysfunction)  Negative  -SW    SI Distraction Test (SI Dysfunction)  Negative  -SW    SANTIAGO (hip vs. SI Dysfunction)  Right:;Positive  -    Lumbar/SI Special Tests Comments  Overall alignment position good without asymmetry noted.  Improved stabilization noted with seated activity.  Prone shows good activation.  Neural tension tests negative this date.   -      User Key  (r) = Recorded By, (t) = Taken By, (c) = Cosigned By    Initials Name Provider Type    Faye Anderson, PT Physical Therapist                     PT Assessment/Plan     Row Name 11/19/19 1000          PT Assessment    Functional Limitations  Limitation in home management;Limitations in community activities;Performance in work activities;Performance in self-care ADL;Performance in leisure activities  -     Impairments  Impaired muscle length;Impaired postural alignment;Pain;Muscle strength;Motor function;Poor body mechanics;Posture  -     Assessment Comments  Patient showing reduced pain and overall improved alignment and symmetry with visit.  No neural tension noted with exam. Improved daily functions with less disability. STG 1 and 3 met thus far.  Good progression with achievement.   -     Rehab Potential  Good  -     Patient/caregiver participated in  establishment of treatment plan and goals  Yes  -SW     Patient would benefit from skilled therapy intervention  Yes  -SW        PT Plan    PT Frequency  1x/week  -SW     Predicted Duration of Therapy Intervention (Therapy Eval)  15 visits  -SW     PT Plan Comments  cont with prone extension program.  Begin stabilization in seated and standing with TB as able.   -SW       User Key  (r) = Recorded By, (t) = Taken By, (c) = Cosigned By    Initials Name Provider Type    SW Faye Buitrago, PT Physical Therapist            OP Exercises     Row Name 11/19/19 1000             Subjective Comments    Subjective Comments  Has been to MD this morning.  Got another US this morning.  Femur bones of fetus are going to be long.  MD FU on 12/17 for glucose test.  Sciatica is good today.  No radicular s/s at all today.  Started a little on Thursday evening at work but has subsided since then. Really feeling good and feels that exercises are helping.   -SW         Subjective Pain    Able to rate subjective pain?  yes  -SW      Pre-Treatment Pain Level  0  -SW      Post-Treatment Pain Level  0  -SW         Exercise 1    Exercise Name 1  hamstring stretch bilaterally   -SW      Reps 1  2  -SW      Time 1  30 sec  -SW         Exercise 2    Exercise Name 2  adductor stretch bilaterally   -SW      Reps 2  2   -SW      Time 2  30 sec  -SW         Exercise 3    Exercise Name 3  piriformis stretch   -SW      Reps 3  2  -SW      Time 3  30 sec  -SW         Exercise 4    Exercise Name 4  physioball lateral tilts  -SW      Reps 4  2  -SW      Time 4  1 min  -SW      Additional Comments  post tilts inc pain to LB R side.   -SW         Exercise 5    Exercise Name 5  physioball circles.   -SW      Reps 5  2  -SW      Time 5  1 min  -SW         Exercise 6    Exercise Name 6  prone lying position  -SW      Time 6  3 minutes  -SW         Exercise 7    Exercise Name 7  prone on elbows  -SW      Reps 7  2  -SW      Time 7  3 minutes  -SW          Exercise 8    Exercise Name 8  prone press ups  -SW      Sets 8  2  -SW      Reps 8  5  -SW      Time 8  5 sec  -SW         Exercise 9    Exercise Name 9  angry cat stretch   -SW      Reps 9  10  -SW      Time 9  5 sec  -SW         Exercise 10    Exercise Name 10  seated brace to pelvic girdle  -SW      Time 10  6 min  -SW      Additional Comments  isolated contractions of PF and TA and multifidus with co-activation as well.   -SW        User Key  (r) = Recorded By, (t) = Taken By, (c) = Cosigned By    Initials Name Provider Type    Faye Anderson, PT Physical Therapist                      PT OP Goals     Row Name 11/19/19 1000          PT Short Term Goals    STG Date to Achieve  12/05/19  -SW     STG 1  independent with HEp for ROM and stretching  -SW     STG 1 Progress  Met  -SW     STG 2  patient to present with improved alignment such that MET not required  -SW     STG 2 Progress  Ongoing;Progressing  -SW     STG 3  pain is mild pre/post treatment 3/10 or less indicating improved management with use of HEP and postural training  -SW     STG 3 Progress  Met  -SW     STG 4  patient to demo floor to waist t/f as to simulate child carry and proper  without compromise to LB mechanics.   -SW     STG 4 Progress  New  -SW     STG 5  dec sciatica to be more localized in buttock region and out of LE  -SW     STG 5 Progress  Progressing  -SW        Long Term Goals    LTG Date to Achieve  02/28/20  -SW     LTG 1  full painfree ROM to LS  -SW     LTG 1 Progress  New  -SW     LTG 2  centralized radicular s/s to buttock and LB region  -SW     LTG 2 Progress  New  -SW     LTG 3  70% better subjectively since induction of PT  -SW     LTG 3 Progress  New  -SW        Time Calculation    PT Goal Re-Cert Due Date  12/05/19  -       User Key  (r) = Recorded By, (t) = Taken By, (c) = Cosigned By    Initials Name Provider Type    Faye Anderson PT Physical Therapist           Therapy Education  Given:  HEP  Program: Reinforced, Progressed  How Provided: Verbal, Demonstration, Written  Provided to: Patient  Level of Understanding: Teach back education performed, Verbalized, Demonstrated              Time Calculation:   Start Time: 1015  Stop Time: 1118  Time Calculation (min): 63 min  Therapy Charges for Today     Code Description Service Date Service Provider Modifiers Qty    10664815591 HC PT THER PROC EA 15 MIN 11/19/2019 Faye Buitrago, PT GP 4                    Faye Buitrago, PT  11/19/2019

## 2019-11-26 DIAGNOSIS — O99.332 TOBACCO SMOKING COMPLICATING PREGNANCY IN SECOND TRIMESTER: Primary | ICD-10-CM

## 2019-11-26 DIAGNOSIS — Z36.2 OTHER ANTENATAL SCREENING BASED ON AMNIOCENTESIS: ICD-10-CM

## 2019-11-26 DIAGNOSIS — Z3A.21 21 WEEKS GESTATION OF PREGNANCY: ICD-10-CM

## 2019-12-02 DIAGNOSIS — Z36.2 OTHER ANTENATAL SCREENING BASED ON AMNIOCENTESIS: ICD-10-CM

## 2019-12-02 DIAGNOSIS — O99.332 TOBACCO SMOKING COMPLICATING PREGNANCY IN SECOND TRIMESTER: ICD-10-CM

## 2019-12-02 DIAGNOSIS — Z3A.21 21 WEEKS GESTATION OF PREGNANCY: ICD-10-CM

## 2019-12-03 ENCOUNTER — APPOINTMENT (OUTPATIENT)
Dept: PHYSICAL THERAPY | Facility: HOSPITAL | Age: 29
End: 2019-12-03

## 2019-12-17 ENCOUNTER — ROUTINE PRENATAL (OUTPATIENT)
Dept: OBSTETRICS AND GYNECOLOGY | Facility: CLINIC | Age: 29
End: 2019-12-17

## 2019-12-17 ENCOUNTER — APPOINTMENT (OUTPATIENT)
Dept: LAB | Facility: HOSPITAL | Age: 29
End: 2019-12-17

## 2019-12-17 VITALS — BODY MASS INDEX: 19.84 KG/M2 | SYSTOLIC BLOOD PRESSURE: 98 MMHG | DIASTOLIC BLOOD PRESSURE: 58 MMHG | WEIGHT: 101.6 LBS

## 2019-12-17 DIAGNOSIS — M54.31 RIGHT SIDED SCIATICA: ICD-10-CM

## 2019-12-17 DIAGNOSIS — Z3A.25 25 WEEKS GESTATION OF PREGNANCY: Primary | ICD-10-CM

## 2019-12-17 DIAGNOSIS — O43.192 MARGINAL INSERTION OF UMBILICAL CORD AFFECTING MANAGEMENT OF MOTHER IN SECOND TRIMESTER: ICD-10-CM

## 2019-12-17 DIAGNOSIS — Z36.89 ENCOUNTER FOR OTHER SPECIFIED ANTENATAL SCREENING: ICD-10-CM

## 2019-12-17 DIAGNOSIS — O99.330 TOBACCO USE DURING PREGNANCY, ANTEPARTUM: ICD-10-CM

## 2019-12-17 DIAGNOSIS — O09.92 HIGH-RISK PREGNANCY, SECOND TRIMESTER: ICD-10-CM

## 2019-12-17 DIAGNOSIS — Z64.1 MULTIGRAVIDA: ICD-10-CM

## 2019-12-17 DIAGNOSIS — O21.9 NAUSEA AND VOMITING DURING PREGNANCY PRIOR TO 22 WEEKS GESTATION: ICD-10-CM

## 2019-12-17 PROBLEM — O44.20 MARGINAL PLACENTA PREVIA: Status: RESOLVED | Noted: 2019-10-29 | Resolved: 2019-12-17

## 2019-12-17 LAB
DEPRECATED RDW RBC AUTO: 41.1 FL (ref 37–54)
ERYTHROCYTE [DISTWIDTH] IN BLOOD BY AUTOMATED COUNT: 12 % (ref 12.3–15.4)
GLUCOSE 1H P 100 G GLC PO SERPL-MCNC: 125 MG/DL (ref 60–140)
HCT VFR BLD AUTO: 30.3 % (ref 34–46.6)
HGB BLD-MCNC: 10.4 G/DL (ref 12–15.9)
MCH RBC QN AUTO: 31.9 PG (ref 26.6–33)
MCHC RBC AUTO-ENTMCNC: 34.3 G/DL (ref 31.5–35.7)
MCV RBC AUTO: 92.9 FL (ref 79–97)
PLATELET # BLD AUTO: 172 10*3/MM3 (ref 140–450)
PMV BLD AUTO: 10.3 FL (ref 6–12)
RBC # BLD AUTO: 3.26 10*6/MM3 (ref 3.77–5.28)
WBC NRBC COR # BLD: 9.32 10*3/MM3 (ref 3.4–10.8)

## 2019-12-17 PROCEDURE — 82950 GLUCOSE TEST: CPT | Performed by: NURSE PRACTITIONER

## 2019-12-17 PROCEDURE — 85027 COMPLETE CBC AUTOMATED: CPT | Performed by: NURSE PRACTITIONER

## 2019-12-17 PROCEDURE — 0502F SUBSEQUENT PRENATAL CARE: CPT | Performed by: OBSTETRICS & GYNECOLOGY

## 2019-12-17 PROCEDURE — 36415 COLL VENOUS BLD VENIPUNCTURE: CPT | Performed by: OBSTETRICS & GYNECOLOGY

## 2019-12-17 RX ORDER — DOCUSATE SODIUM 100 MG/1
100 CAPSULE, LIQUID FILLED ORAL 2 TIMES DAILY
Qty: 60 CAPSULE | Refills: 1 | Status: SHIPPED | OUTPATIENT
Start: 2019-12-17 | End: 2021-10-06

## 2019-12-17 RX ORDER — FERROUS SULFATE 325(65) MG
325 TABLET ORAL
Qty: 90 TABLET | Refills: 4 | Status: SHIPPED | OUTPATIENT
Start: 2019-12-17 | End: 2021-10-06

## 2019-12-17 NOTE — PROGRESS NOTES
CC: Prenatal visit    Christiana Aguirre is a 28 y.o.  at 25w3d.  Doing well.  No complaints.  Denies contractions, LOF, or VB.  Reports good FM.    BP 98/58   Wt 46.1 kg (101 lb 9.6 oz)   LMP 2019   BMI 19.84 kg/m²     Fundal Height (cm): 25 cm  Fetal Heart Rate: 160    4th pregnancy Problems (from 19 to present)     Problem Noted Resolved    Right sided sciatica 10/29/2019 by Daisha Johnston APRN No    Overview Signed 10/29/2019  4:14 PM by Daisha Johnston APRN     Physical Therapy referral amended to Faye Buitrago DPT.          Marginal insertion of umbilical cord affecting management of mother in second trimester 10/29/2019 by Daisha Johnston APRN No    Overview Addendum 2019 10:06 AM by Daisha Johnston APRN     Resolved on US 2019         High-risk pregnancy, second trimester 10/29/2019 by Daisha Johnston APRN No    Overview Addendum 10/29/2019  4:32 PM by Daisha Johnston APRN     A positive / neg antibody / Rubella Immune / GBS unknown  Dating: DELIA: 3/28/2020 by LMP c/w CRL dating scan on 19  Genetics: Declines  Tdap: 28 wks  Flu: Given 10/29  Anatomy: 10/29/19-marginal insertion of cord, posterior marginal previa, echogenic focus-thorax.  1hr Glucola: @ 28 wk  H&H/Plts: @ 28 wk  H&H/Plts: 12.4 / 36.5%/ 216 on 19.   Breastfeeding:?  BC: ?         Multigravida 2019 by Samra Hayes APRN No    Tobacco use during pregnancy, antepartum 2019 by Samra Hayes APRN No    Overview Addendum 2019  9:52 AM by Daisha Johnston APRN     6 cigarettes a day; decreased from a pack a day;         Nausea and vomiting during pregnancy prior to 22 weeks gestation 2019 by Samra Hayes APRN No    Overview Signed 10/29/2019  4:13 PM by aDisha Johnston, AC     Stable         Marginal placenta previa 10/29/2019 by Daisha Johnston APRN 2019 by Van Cordova DO    Overview Addendum  2019 10:05 AM by Daisha Johnston APRN     Resolved; posterior placenta, no previa and cord insertion wnl on U/S on 2019                   A/P: Christiana Aguirre is a 28 y.o.  at 25w3d.  Doing well with appropriately progressing pregnancy at this time.  Patient to do Glucola today.  Follow-up in 3 weeks, sooner as needed.  Bleeding and cramping precautions given.  - RTC in 3 weeks     Diagnosis Plan   1. 25 weeks gestation of pregnancy     2. Right sided sciatica     3. Marginal insertion of umbilical cord affecting management of mother in second trimester     4. High-risk pregnancy, second trimester     5. Multigravida     6. Tobacco use during pregnancy, antepartum     7. Nausea and vomiting during pregnancy prior to 22 weeks gestation       Van Cordova DO  2019  10:23 AM

## 2020-01-07 ENCOUNTER — ROUTINE PRENATAL (OUTPATIENT)
Dept: OBSTETRICS AND GYNECOLOGY | Facility: CLINIC | Age: 30
End: 2020-01-07

## 2020-01-07 VITALS — SYSTOLIC BLOOD PRESSURE: 97 MMHG | BODY MASS INDEX: 21.29 KG/M2 | DIASTOLIC BLOOD PRESSURE: 60 MMHG | WEIGHT: 109 LBS

## 2020-01-07 DIAGNOSIS — Z3A.28 28 WEEKS GESTATION OF PREGNANCY: Primary | ICD-10-CM

## 2020-01-07 DIAGNOSIS — O43.192 MARGINAL INSERTION OF UMBILICAL CORD AFFECTING MANAGEMENT OF MOTHER IN SECOND TRIMESTER: ICD-10-CM

## 2020-01-07 DIAGNOSIS — M54.31 RIGHT SIDED SCIATICA: ICD-10-CM

## 2020-01-07 DIAGNOSIS — Z64.1 MULTIGRAVIDA: ICD-10-CM

## 2020-01-07 DIAGNOSIS — O99.330 TOBACCO USE DURING PREGNANCY, ANTEPARTUM: ICD-10-CM

## 2020-01-07 DIAGNOSIS — O21.9 NAUSEA AND VOMITING DURING PREGNANCY PRIOR TO 22 WEEKS GESTATION: ICD-10-CM

## 2020-01-07 DIAGNOSIS — O09.92 HIGH-RISK PREGNANCY, SECOND TRIMESTER: ICD-10-CM

## 2020-01-07 PROCEDURE — 0502F SUBSEQUENT PRENATAL CARE: CPT | Performed by: OBSTETRICS & GYNECOLOGY

## 2020-01-07 PROCEDURE — 90715 TDAP VACCINE 7 YRS/> IM: CPT | Performed by: OBSTETRICS & GYNECOLOGY

## 2020-01-07 PROCEDURE — 90471 IMMUNIZATION ADMIN: CPT | Performed by: OBSTETRICS & GYNECOLOGY

## 2020-01-07 NOTE — PROGRESS NOTES
CC: Prenatal visit    Christiana Aguirre is a 29 y.o.  at 28w3d.  Doing well.  No complaints.  Denies contractions, LOF, or VB.  Reports good FM.    BP 97/60   Wt 49.4 kg (109 lb)   LMP 2019   BMI 21.29 kg/m²     Fundal Height (cm): 28 cm  Fetal Heart Rate: 155    4th pregnancy Problems (from 19 to present)     Problem Noted Resolved    Right sided sciatica 10/29/2019 by Daisha Johnston APRN No    Overview Signed 10/29/2019  4:14 PM by Daisha Johnston APRN     Physical Therapy referral amended to Faye Buitrago DPT.          Marginal insertion of umbilical cord affecting management of mother in second trimester 10/29/2019 by Daisha Johnston APRN No    Overview Addendum 2019 10:06 AM by Daisha Johnston APRN     Resolved on US 2019         High-risk pregnancy, second trimester 10/29/2019 by Daisha Johnston APRN No    Overview Addendum 10/29/2019  4:32 PM by Daisha Johnston APRN     A positive / neg antibody / Rubella Immune / GBS unknown  Dating: DELIA: 3/28/2020 by LMP c/w CRL dating scan on 19  Genetics: Declines  Tdap: 28 wks  Flu: Given 10/29  Anatomy: 10/29/19-marginal insertion of cord, posterior marginal previa, echogenic focus-thorax.  1hr Glucola: @ 28 wk  H&H/Plts: @ 28 wk  H&H/Plts: 12.4 / 36.5%/ 216 on 19.   Breastfeeding:?  BC: ?         Multigravida 2019 by Samra Hayes APRN No    Tobacco use during pregnancy, antepartum 2019 by Samra Hayes APRN No    Overview Addendum 2019  9:52 AM by Daisha Johnston APRN     6 cigarettes a day; decreased from a pack a day;         Nausea and vomiting during pregnancy prior to 22 weeks gestation 2019 by Samra Hayes APRN No    Overview Signed 10/29/2019  4:13 PM by Daisha Johnston APRN     Stable         Marginal placenta previa 10/29/2019 by Daisha Johnston APRN 2019 by Van Cordova DO    Overview Addendum  2019 10:05 AM by Daisha Johnston APRN     Resolved; posterior placenta, no previa and cord insertion wnl on U/S on 2019                   A/P: Christiana Aguirre is a 29 y.o.  at 28w3d.  Doing well with appropriately progressing pregnancy.  Patient is taking iron and Colace for anemia.  Patient to get Tdap today.  Has received flu shot.  No concerns or questions.  Fetal kick count and  labor precautions given.  Reviewed Glucola with patient which was normal.  - RTC in 4 weeks     Diagnosis Plan   1. 28 weeks gestation of pregnancy     2. Right sided sciatica     3. Marginal insertion of umbilical cord affecting management of mother in second trimester     4. High-risk pregnancy, second trimester     5. Multigravida     6. Tobacco use during pregnancy, antepartum     7. Nausea and vomiting during pregnancy prior to 22 weeks gestation       Van Cordova DO  2020  9:56 AM

## 2020-02-04 ENCOUNTER — ROUTINE PRENATAL (OUTPATIENT)
Dept: OBSTETRICS AND GYNECOLOGY | Facility: CLINIC | Age: 30
End: 2020-02-04

## 2020-02-04 VITALS — SYSTOLIC BLOOD PRESSURE: 97 MMHG | BODY MASS INDEX: 20.51 KG/M2 | DIASTOLIC BLOOD PRESSURE: 58 MMHG | WEIGHT: 105 LBS

## 2020-02-04 DIAGNOSIS — O99.330 TOBACCO USE DURING PREGNANCY, ANTEPARTUM: ICD-10-CM

## 2020-02-04 DIAGNOSIS — O09.92 HIGH-RISK PREGNANCY, SECOND TRIMESTER: ICD-10-CM

## 2020-02-04 DIAGNOSIS — O43.192 MARGINAL INSERTION OF UMBILICAL CORD AFFECTING MANAGEMENT OF MOTHER IN SECOND TRIMESTER: ICD-10-CM

## 2020-02-04 DIAGNOSIS — M54.31 RIGHT SIDED SCIATICA: ICD-10-CM

## 2020-02-04 DIAGNOSIS — Z64.1 MULTIGRAVIDA: ICD-10-CM

## 2020-02-04 PROCEDURE — 0502F SUBSEQUENT PRENATAL CARE: CPT | Performed by: OBSTETRICS & GYNECOLOGY

## 2020-02-04 NOTE — PROGRESS NOTES
CC: Prenatal visit    Christiana Aguirre is a 29 y.o.  at 32w3d.  Doing well.  No complaints.  Denies contractions, LOF, or VB.  Reports good FM.    BP 97/58   Wt 47.6 kg (105 lb)   LMP 2019   BMI 20.51 kg/m²     Fundal Height (cm): 32 cm  Fetal Heart Rate: 140    4th pregnancy Problems (from 19 to present)     Problem Noted Resolved    Right sided sciatica 10/29/2019 by Daisha Johnston APRN No    Overview Signed 10/29/2019  4:14 PM by Daisha Johnston APRN     Physical Therapy referral amended to Faye Buitrago DPT.          Marginal insertion of umbilical cord affecting management of mother in second trimester 10/29/2019 by Daisha Johnston APRN No    Overview Addendum 2019 10:06 AM by Daisha Johnston APRN     Resolved on US 2019         High-risk pregnancy, second trimester 10/29/2019 by Daisha Johnston APRN No    Overview Addendum 10/29/2019  4:32 PM by Daisha Johnston APRN     A positive / neg antibody / Rubella Immune / GBS unknown  Dating: DELIA: 3/28/2020 by LMP c/w CRL dating scan on 19  Genetics: Declines  Tdap: 28 wks  Flu: Given 10/29  Anatomy: 10/29/19-marginal insertion of cord, posterior marginal previa, echogenic focus-thorax.  1hr Glucola: @ 28 wk  H&H/Plts: @ 28 wk  H&H/Plts: 12.4 / 36.5%/ 216 on 19.   Breastfeeding:?  BC: ?         Multigravida 2019 by Samra Hayes APRN No    Tobacco use during pregnancy, antepartum 2019 by Samra Hayes APRN No    Overview Addendum 2019  9:52 AM by Daisha Johnston APRN     6 cigarettes a day; decreased from a pack a day;         Nausea and vomiting during pregnancy prior to 22 weeks gestation 2019 by Samra Hayes APRN No    Overview Signed 10/29/2019  4:13 PM by Daisha Johnston APRN     Stable         Marginal placenta previa 10/29/2019 by Daisha Johnston APRN 2019 by Van Cordova DO    Overview Addendum  2019 10:05 AM by Daisha Johnston APRN     Resolved; posterior placenta, no previa and cord insertion wnl on U/S on 2019                   A/P: Christiana Aguirre is a 29 y.o.  at 32w3d.  Doing well with appropriately progressing pregnancy at this time.  Patient to follow-up with me in 2 weeks, sooner as needed.  Fetal kick count and  labor precautions given.  - RTC in 2 weeks     Diagnosis Plan   1. Right sided sciatica     2. Marginal insertion of umbilical cord affecting management of mother in second trimester     3. High-risk pregnancy, second trimester     4. Multigravida     5. Tobacco use during pregnancy, antepartum       Van Cordova,   2020  10:00 AM

## 2020-02-18 ENCOUNTER — ROUTINE PRENATAL (OUTPATIENT)
Dept: OBSTETRICS AND GYNECOLOGY | Facility: CLINIC | Age: 30
End: 2020-02-18

## 2020-02-18 VITALS — SYSTOLIC BLOOD PRESSURE: 102 MMHG | DIASTOLIC BLOOD PRESSURE: 58 MMHG | WEIGHT: 109 LBS | BODY MASS INDEX: 21.29 KG/M2

## 2020-02-18 DIAGNOSIS — O99.330 TOBACCO USE DURING PREGNANCY, ANTEPARTUM: ICD-10-CM

## 2020-02-18 DIAGNOSIS — Z64.1 MULTIGRAVIDA: ICD-10-CM

## 2020-02-18 DIAGNOSIS — O43.192 MARGINAL INSERTION OF UMBILICAL CORD AFFECTING MANAGEMENT OF MOTHER IN SECOND TRIMESTER: ICD-10-CM

## 2020-02-18 DIAGNOSIS — O09.92 HIGH-RISK PREGNANCY, SECOND TRIMESTER: ICD-10-CM

## 2020-02-18 DIAGNOSIS — M54.31 RIGHT SIDED SCIATICA: ICD-10-CM

## 2020-02-18 DIAGNOSIS — O21.9 NAUSEA AND VOMITING DURING PREGNANCY PRIOR TO 22 WEEKS GESTATION: ICD-10-CM

## 2020-02-18 DIAGNOSIS — Z3A.34 34 WEEKS GESTATION OF PREGNANCY: Primary | ICD-10-CM

## 2020-02-18 PROCEDURE — 0502F SUBSEQUENT PRENATAL CARE: CPT | Performed by: OBSTETRICS & GYNECOLOGY

## 2020-02-18 NOTE — PROGRESS NOTES
CC: Prenatal visit    Christiana Aguirre is a 29 y.o.  at 34w3d.  Doing well.  No complaints.  Denies contractions, LOF, or VB.  Reports good FM.    /58   Wt 49.4 kg (109 lb)   LMP 2019   BMI 21.29 kg/m²     Fundal Height (cm): 34 cm  Fetal Heart Rate: 155    4th pregnancy Problems (from 19 to present)     Problem Noted Resolved    Right sided sciatica 10/29/2019 by Daisha Johnston APRN No    Overview Signed 10/29/2019  4:14 PM by Daisha Johnston APRN     Physical Therapy referral amended to Faye Buitrago DPT.          Marginal insertion of umbilical cord affecting management of mother in second trimester 10/29/2019 by Daisha Johnston APRN No    Overview Addendum 2019 10:06 AM by Daisha Johnston APRN     Resolved on US 2019         High-risk pregnancy, second trimester 10/29/2019 by Daisha Johnston APRN No    Overview Addendum 10/29/2019  4:32 PM by Daisha Johnston APRN     A positive / neg antibody / Rubella Immune / GBS unknown  Dating: DELIA: 3/28/2020 by LMP c/w CRL dating scan on 19  Genetics: Declines  Tdap: 28 wks  Flu: Given 10/29  Anatomy: 10/29/19-marginal insertion of cord, posterior marginal previa, echogenic focus-thorax.  1hr Glucola: @ 28 wk  H&H/Plts: @ 28 wk  H&H/Plts: 12.4 / 36.5%/ 216 on 19.   Breastfeeding:?  BC: ?         Multigravida 2019 by Samra Hayes APRN No    Tobacco use during pregnancy, antepartum 2019 by Samra Hayes APRN No    Overview Addendum 2019  9:52 AM by Daisha Johnston APRN     6 cigarettes a day; decreased from a pack a day;         Nausea and vomiting during pregnancy prior to 22 weeks gestation 2019 by Samra Hayes APRN No    Overview Signed 10/29/2019  4:13 PM by Daisha Johnston APRN     Stable         Marginal placenta previa 10/29/2019 by Daisha Johnston APRN 2019 by Van Cordova DO    Overview Addendum  2019 10:05 AM by Daisha Johnston APRN     Resolved; posterior placenta, no previa and cord insertion wnl on U/S on 2019                   A/P: Christiana Aguirre is a 29 y.o.  at 34w3d.  Doing well with appropriately progressing pregnancy at this time.  Discussed with patient possible induction at 39 weeks she is uncertain will readdress at next visit.  Bleeding and cramping precautions given.   labor precautions given and fetal kick count precautions given.  Patient to return to see me in 2 weeks, sooner as needed.  - RTC in 2 weeks     Diagnosis Plan   1. 34 weeks gestation of pregnancy     2. Right sided sciatica     3. Marginal insertion of umbilical cord affecting management of mother in second trimester     4. High-risk pregnancy, second trimester     5. Multigravida     6. Tobacco use during pregnancy, antepartum     7. Nausea and vomiting during pregnancy prior to 22 weeks gestation       Van Cordova DO  2020  10:12 AM

## 2020-02-21 ENCOUNTER — HOSPITAL ENCOUNTER (OUTPATIENT)
Facility: HOSPITAL | Age: 30
Discharge: HOME | End: 2020-02-22
Attending: OBSTETRICS & GYNECOLOGY | Admitting: OBSTETRICS & GYNECOLOGY

## 2020-02-21 LAB
A1 MICROGLOB PLACENTAL VAG QL: NEGATIVE
BACTERIA UR QL AUTO: ABNORMAL /HPF
BILIRUB UR QL STRIP: NEGATIVE
CANDIDA ALBICANS: POSITIVE
CLARITY UR: ABNORMAL
COLOR UR: YELLOW
GARDNERELLA VAGINALIS: NEGATIVE
GLUCOSE UR STRIP-MCNC: NEGATIVE MG/DL
HGB UR QL STRIP.AUTO: NEGATIVE
HYALINE CASTS UR QL AUTO: ABNORMAL /LPF
KETONES UR QL STRIP: NEGATIVE
LEUKOCYTE ESTERASE UR QL STRIP.AUTO: ABNORMAL
NITRITE UR QL STRIP: NEGATIVE
PH UR STRIP.AUTO: 7.5 [PH] (ref 5–9)
PROT UR QL STRIP: NEGATIVE
RBC # UR: ABNORMAL /HPF
REF LAB TEST METHOD: ABNORMAL
SP GR UR STRIP: 1 (ref 1–1.03)
SQUAMOUS #/AREA URNS HPF: ABNORMAL /HPF
T VAGINALIS DNA VAG QL PROBE+SIG AMP: NEGATIVE
UROBILINOGEN UR QL STRIP: ABNORMAL
WBC UR QL AUTO: ABNORMAL /HPF

## 2020-02-21 PROCEDURE — 96372 THER/PROPH/DIAG INJ SC/IM: CPT

## 2020-02-21 PROCEDURE — 25010000002 BETAMETHASONE ACET & SOD PHOS PER 4 MG: Performed by: OBSTETRICS & GYNECOLOGY

## 2020-02-21 PROCEDURE — 87510 GARDNER VAG DNA DIR PROBE: CPT | Performed by: OBSTETRICS & GYNECOLOGY

## 2020-02-21 PROCEDURE — 84112 EVAL AMNIOTIC FLUID PROTEIN: CPT | Performed by: OBSTETRICS & GYNECOLOGY

## 2020-02-21 PROCEDURE — 81001 URINALYSIS AUTO W/SCOPE: CPT | Performed by: OBSTETRICS & GYNECOLOGY

## 2020-02-21 PROCEDURE — 87480 CANDIDA DNA DIR PROBE: CPT | Performed by: OBSTETRICS & GYNECOLOGY

## 2020-02-21 PROCEDURE — 87660 TRICHOMONAS VAGIN DIR PROBE: CPT | Performed by: OBSTETRICS & GYNECOLOGY

## 2020-02-21 RX ORDER — FLUCONAZOLE 150 MG/1
150 TABLET ORAL ONCE
Status: COMPLETED | OUTPATIENT
Start: 2020-02-21 | End: 2020-02-21

## 2020-02-21 RX ORDER — HYDROXYZINE PAMOATE 50 MG/1
50 CAPSULE ORAL ONCE
Status: COMPLETED | OUTPATIENT
Start: 2020-02-21 | End: 2020-02-21

## 2020-02-21 RX ORDER — SODIUM CHLORIDE, SODIUM LACTATE, POTASSIUM CHLORIDE, CALCIUM CHLORIDE 600; 310; 30; 20 MG/100ML; MG/100ML; MG/100ML; MG/100ML
INJECTION, SOLUTION INTRAVENOUS
Status: COMPLETED
Start: 2020-02-21 | End: 2020-02-21

## 2020-02-21 RX ORDER — SODIUM CHLORIDE, SODIUM LACTATE, POTASSIUM CHLORIDE, CALCIUM CHLORIDE 600; 310; 30; 20 MG/100ML; MG/100ML; MG/100ML; MG/100ML
1000 INJECTION, SOLUTION INTRAVENOUS ONCE
Status: COMPLETED | OUTPATIENT
Start: 2020-02-21 | End: 2020-02-21

## 2020-02-21 RX ORDER — SODIUM CHLORIDE 0.9 % (FLUSH) 0.9 %
10 SYRINGE (ML) INJECTION EVERY 12 HOURS SCHEDULED
Status: DISCONTINUED | OUTPATIENT
Start: 2020-02-21 | End: 2020-02-22 | Stop reason: HOSPADM

## 2020-02-21 RX ORDER — SODIUM CHLORIDE 0.9 % (FLUSH) 0.9 %
10 SYRINGE (ML) INJECTION AS NEEDED
Status: DISCONTINUED | OUTPATIENT
Start: 2020-02-21 | End: 2020-02-22 | Stop reason: HOSPADM

## 2020-02-21 RX ORDER — BETAMETHASONE SODIUM PHOSPHATE AND BETAMETHASONE ACETATE 3; 3 MG/ML; MG/ML
12 INJECTION, SUSPENSION INTRA-ARTICULAR; INTRALESIONAL; INTRAMUSCULAR; SOFT TISSUE EVERY 24 HOURS
Status: DISCONTINUED | OUTPATIENT
Start: 2020-02-21 | End: 2020-02-22 | Stop reason: HOSPADM

## 2020-02-21 RX ADMIN — HYDROXYZINE PAMOATE 50 MG: 50 CAPSULE ORAL at 19:05

## 2020-02-21 RX ADMIN — SODIUM CHLORIDE, POTASSIUM CHLORIDE, SODIUM LACTATE AND CALCIUM CHLORIDE 1000 ML: 600; 310; 30; 20 INJECTION, SOLUTION INTRAVENOUS at 17:12

## 2020-02-21 RX ADMIN — FLUCONAZOLE 150 MG: 150 TABLET ORAL at 21:14

## 2020-02-21 RX ADMIN — BETAMETHASONE SODIUM PHOSPHATE AND BETAMETHASONE ACETATE 12 MG: 3; 3 INJECTION, SUSPENSION INTRA-ARTICULAR; INTRALESIONAL; INTRAMUSCULAR at 17:25

## 2020-02-21 RX ADMIN — SODIUM CHLORIDE, SODIUM LACTATE, POTASSIUM CHLORIDE, CALCIUM CHLORIDE 1000 ML: 600; 310; 30; 20 INJECTION, SOLUTION INTRAVENOUS at 17:12

## 2020-02-22 VITALS
SYSTOLIC BLOOD PRESSURE: 91 MMHG | BODY MASS INDEX: 21.29 KG/M2 | DIASTOLIC BLOOD PRESSURE: 52 MMHG | HEIGHT: 60 IN | HEART RATE: 74 BPM | OXYGEN SATURATION: 99 % | TEMPERATURE: 98.3 F | RESPIRATION RATE: 16 BRPM

## 2020-02-22 PROCEDURE — 25010000002 BETAMETHASONE ACET & SOD PHOS PER 4 MG: Performed by: OBSTETRICS & GYNECOLOGY

## 2020-02-22 PROCEDURE — 96372 THER/PROPH/DIAG INJ SC/IM: CPT

## 2020-02-22 PROCEDURE — G0463 HOSPITAL OUTPT CLINIC VISIT: HCPCS

## 2020-02-22 PROCEDURE — 99213 OFFICE O/P EST LOW 20 MIN: CPT | Performed by: OBSTETRICS & GYNECOLOGY

## 2020-02-22 PROCEDURE — 59025 FETAL NON-STRESS TEST: CPT

## 2020-02-22 PROCEDURE — 59025 FETAL NON-STRESS TEST: CPT | Performed by: OBSTETRICS & GYNECOLOGY

## 2020-02-22 RX ORDER — BETAMETHASONE SODIUM PHOSPHATE AND BETAMETHASONE ACETATE 3; 3 MG/ML; MG/ML
12 INJECTION, SUSPENSION INTRA-ARTICULAR; INTRALESIONAL; INTRAMUSCULAR; SOFT TISSUE ONCE
Status: COMPLETED | OUTPATIENT
Start: 2020-02-22 | End: 2020-02-22

## 2020-02-22 RX ADMIN — BETAMETHASONE SODIUM PHOSPHATE AND BETAMETHASONE ACETATE 12 MG: 3; 3 INJECTION, SUSPENSION INTRA-ARTICULAR; INTRALESIONAL; INTRAMUSCULAR at 19:17

## 2020-02-22 NOTE — NURSING NOTE
Christiana Aguirre, a  at 35w0d with an DELIA of 3/28/2020, by Last Menstrual Period, was seen at [facility] LABOR DELIVERY for a nonstress test.    Chief Complaint   Patient presents with   • Leaking Fluid     started leaking fluid this morning, has stopped now, states it was clear, denies vaginal bleeding   • Decreased Fetal Movement     states baby has not been as active today       Patient Active Problem List   Diagnosis   • Tobacco use during pregnancy, antepartum   • Nausea and vomiting during pregnancy prior to 22 weeks gestation   • Multigravida   • Right sided sciatica   • Marginal insertion of umbilical cord affecting management of mother in second trimester   • High-risk pregnancy, second trimester       Start Time: 1535  Stop Time: 0916             Monitored over night. LR bolus. 1st dose betamethasone given. Pt to return this PM for 2nd dose. Ready for discharge

## 2020-02-22 NOTE — PROGRESS NOTES
[facility]  Christiana Aguirre  : 1990  MRN: [id number]  CSN: [id number]    L&D Triage note    Subjective 28yo  @ 35wks presented last night for rule out ROM. No evidence of ruptured membranes, however regular contractions started soon after presentation. No LOF overnight and contractions have slowed now. She denies any pain currently. Reports active FM. No VB or LOF. Diflucan 150mg PO x1 given last night for yeast on vag panel. She was also given 1st dose of BMTZ. NST reactive/Category 1 throughout the night.     Min/max vitals past 24 hours:  Temp  Min: 97.8 °F (36.6 °C)  Max: 98.3 °F (36.8 °C)   BP  Min: 91/52  Max: 114/53   Pulse  Min: 74  Max: 91   Resp  Min: 16  Max: 16    Lab Results (most recent)     Procedure Component Value Units Date/Time    Gardnerella vaginalis, Trichomonas vaginalis, Candida albicans, DNA - Swab, Vagina [[id number]] Collected:  20    Specimen:  Swab from Vagina Updated:  20     PABLO ALBICANS Positive     GARDNERELLA VAGINALIS Negative     TRICHOMONAS VAGINALIS Negative    Urinalysis, Microscopic Only - Urine, Clean Catch [[id number]]  (Abnormal) Collected:  20    Specimen:  Urine, Clean Catch Updated:  20     RBC, UA None Seen /HPF      WBC, UA 6-12 /HPF      Bacteria, UA 1+ /HPF      Squamous Epithelial Cells, UA 6-12 /HPF      Hyaline Casts, UA 3-6 /LPF      Methodology Automated Microscopy    Urinalysis With Microscopic If Indicated (No Culture) - Urine, Clean Catch [[id number]]  (Abnormal) Collected:  20    Specimen:  Urine, Clean Catch Updated:  20     Color, UA Yellow     Appearance, UA Cloudy     pH, UA 7.5     Specific [place], UA 1.004     Comment: Result obtained by Refractometer        Glucose, UA Negative     Ketones, UA Negative     Bilirubin, UA Negative     Blood, UA Negative     Protein, UA Negative     Leuk Esterase, UA Moderate (2+)     Nitrite, UA Negative     Urobilinogen, UA 0.2  E.U./dL    PAMG-1, Rapid Assay For ROM - Amniotic Fluid, Amniotic Sac [[id number]]  (Normal) Collected:  02/21/20 1733    Specimen:  Amniotic Fluid from Amniotic Sac Updated:  02/21/20 1749     Rupture of Membranes Negative                Assessment  1. No ROM or evidence of PTL at this time. Pt requesting to go home.     Plan  1. To home this morning  2. Return precautions reviewed  3. Follow up for 2nd BMTZ this evening.       This document has been electronically signed by Samuel Farooq MD on February 22, 2020 9:19 AM

## 2020-02-22 NOTE — NON STRESS TEST
Christiana Aguirre, a  at 35w0d with an DELIA of 3/28/2020, by Last Menstrual Period, was seen at [facility] LABOR DELIVERY for a nonstress test.    Chief Complaint   Patient presents with   • Leaking Fluid     started leaking fluid this morning, has stopped now, states it was clear, denies vaginal bleeding   • Decreased Fetal Movement     states baby has not been as active today       Patient Active Problem List   Diagnosis   • Tobacco use during pregnancy, antepartum   • Nausea and vomiting during pregnancy prior to 22 weeks gestation   • Multigravida   • Right sided sciatica   • Marginal insertion of umbilical cord affecting management of mother in second trimester   • High-risk pregnancy, second trimester       Start Time: 1535 2020  Stop Time: 0916 2020       Monitored over night. LR bolus. 1st dose betamethasone given. Pt to return this PM for 2nd dose. Ready for discharge

## 2020-02-22 NOTE — DISCHARGE INSTR - ACTIVITY
Please return to unit for regular contractions that don't subside with rest and hydration, bright red vaginal bleeding, leaking of fluid or decreased fetal movement. Follow up with provider at next scheduled appointment.     See clinical references attached to discharge paperwork.       Please return at 5:30pm today for 2nd Betamethasone Injection

## 2020-02-23 NOTE — NURSING NOTE
Patient arrives to unit for scheduled dose of betamethazone injection.  No triage or NST required per MD orders.

## 2020-02-25 ENCOUNTER — HOSPITAL ENCOUNTER (OUTPATIENT)
Facility: HOSPITAL | Age: 30
Discharge: HOME | End: 2020-02-25
Attending: OBSTETRICS & GYNECOLOGY | Admitting: OBSTETRICS & GYNECOLOGY

## 2020-02-25 ENCOUNTER — TELEPHONE (OUTPATIENT)
Dept: OBSTETRICS AND GYNECOLOGY | Facility: CLINIC | Age: 30
End: 2020-02-25

## 2020-02-25 VITALS
RESPIRATION RATE: 20 BRPM | TEMPERATURE: 98 F | DIASTOLIC BLOOD PRESSURE: 66 MMHG | OXYGEN SATURATION: 100 % | SYSTOLIC BLOOD PRESSURE: 108 MMHG | HEART RATE: 96 BPM

## 2020-02-25 LAB
BACTERIA UR QL AUTO: ABNORMAL /HPF
BILIRUB UR QL STRIP: NEGATIVE
CANDIDA ALBICANS: NEGATIVE
CLARITY UR: CLEAR
COLOR UR: YELLOW
GARDNERELLA VAGINALIS: NEGATIVE
GLUCOSE UR STRIP-MCNC: NEGATIVE MG/DL
HGB UR QL STRIP.AUTO: NEGATIVE
HYALINE CASTS UR QL AUTO: ABNORMAL /LPF
KETONES UR QL STRIP: NEGATIVE
LEUKOCYTE ESTERASE UR QL STRIP.AUTO: ABNORMAL
NITRITE UR QL STRIP: NEGATIVE
PH UR STRIP.AUTO: 7.5 [PH] (ref 5–9)
PROT UR QL STRIP: NEGATIVE
RBC # UR: ABNORMAL /HPF
REF LAB TEST METHOD: ABNORMAL
SP GR UR STRIP: 1.01 (ref 1–1.03)
SQUAMOUS #/AREA URNS HPF: ABNORMAL /HPF
T VAGINALIS DNA VAG QL PROBE+SIG AMP: NEGATIVE
UROBILINOGEN UR QL STRIP: ABNORMAL
WBC UR QL AUTO: ABNORMAL /HPF

## 2020-02-25 PROCEDURE — 59025 FETAL NON-STRESS TEST: CPT

## 2020-02-25 PROCEDURE — 81001 URINALYSIS AUTO W/SCOPE: CPT | Performed by: OBSTETRICS & GYNECOLOGY

## 2020-02-25 PROCEDURE — 87480 CANDIDA DNA DIR PROBE: CPT | Performed by: OBSTETRICS & GYNECOLOGY

## 2020-02-25 PROCEDURE — 87660 TRICHOMONAS VAGIN DIR PROBE: CPT | Performed by: OBSTETRICS & GYNECOLOGY

## 2020-02-25 PROCEDURE — 59025 FETAL NON-STRESS TEST: CPT | Performed by: OBSTETRICS & GYNECOLOGY

## 2020-02-25 PROCEDURE — G0463 HOSPITAL OUTPT CLINIC VISIT: HCPCS

## 2020-02-25 PROCEDURE — 87510 GARDNER VAG DNA DIR PROBE: CPT | Performed by: OBSTETRICS & GYNECOLOGY

## 2020-02-25 NOTE — NON STRESS TEST
Christiana Aguirre, a  at 35w3d with an DELIA of 3/28/2020, by Last Menstrual Period, was seen at [facility] LABOR DELIVERY for a nonstress test.    Chief Complaint   Patient presents with   • Contractions   • Decreased Fetal Movement       Patient Active Problem List   Diagnosis   • Tobacco use during pregnancy, antepartum   • Nausea and vomiting during pregnancy prior to 22 weeks gestation   • Multigravida   • Right sided sciatica   • Marginal insertion of umbilical cord affecting management of mother in second trimester   • High-risk pregnancy, second trimester       Start Time: 1215  Stop Time: 1235    Interpretation A  Nonstress Test Interpretation A: Reactive (20 1235 : Korin Santiago, RN)  Comments A: strip reviewed with JUANA Louie (20 1235 : Korin Santiago, RN)      Vag panel negative, no cervical change

## 2020-02-25 NOTE — TELEPHONE ENCOUNTER
PATIENT  CALLED AND SAYS THAT SHE IS HAVING BACK PAIN AND HER CONTRACTIONS ARE 2 MIN APART. HE SAYS THAT HE IS CURRENTLY ON THE ROAD. I TOLD HIM SHE REALLY NEEDS TO GET TO THE HOSPITAL.   HE SAYS HE WILL TALK WITH PATIENT

## 2020-02-25 NOTE — DISCHARGE INSTR - ACTIVITY
Return for leaking of fluid, vaginal bleeding, or decreased fetal movement. Get plenty of rest, stay hydrated with water, you may take tylenol to help with discomfort

## 2020-03-06 ENCOUNTER — ROUTINE PRENATAL (OUTPATIENT)
Dept: OBSTETRICS AND GYNECOLOGY | Facility: CLINIC | Age: 30
End: 2020-03-06

## 2020-03-06 VITALS — WEIGHT: 109 LBS | DIASTOLIC BLOOD PRESSURE: 57 MMHG | BODY MASS INDEX: 21.29 KG/M2 | SYSTOLIC BLOOD PRESSURE: 100 MMHG

## 2020-03-06 DIAGNOSIS — O09.92 HIGH-RISK PREGNANCY, SECOND TRIMESTER: ICD-10-CM

## 2020-03-06 DIAGNOSIS — O43.192 MARGINAL INSERTION OF UMBILICAL CORD AFFECTING MANAGEMENT OF MOTHER IN SECOND TRIMESTER: ICD-10-CM

## 2020-03-06 DIAGNOSIS — Z3A.36 36 WEEKS GESTATION OF PREGNANCY: Primary | ICD-10-CM

## 2020-03-06 DIAGNOSIS — Z64.1 MULTIGRAVIDA: ICD-10-CM

## 2020-03-06 DIAGNOSIS — O99.330 TOBACCO USE DURING PREGNANCY, ANTEPARTUM: ICD-10-CM

## 2020-03-06 DIAGNOSIS — M54.31 RIGHT SIDED SCIATICA: ICD-10-CM

## 2020-03-06 DIAGNOSIS — O21.9 NAUSEA AND VOMITING DURING PREGNANCY PRIOR TO 22 WEEKS GESTATION: ICD-10-CM

## 2020-03-06 PROCEDURE — 0502F SUBSEQUENT PRENATAL CARE: CPT | Performed by: OBSTETRICS & GYNECOLOGY

## 2020-03-06 PROCEDURE — 87653 STREP B DNA AMP PROBE: CPT | Performed by: OBSTETRICS & GYNECOLOGY

## 2020-03-06 NOTE — PROGRESS NOTES
CC: Prenatal visit    Christiana Aguirre is a 29 y.o.  at 36w6d.  Doing well.  No complaints.  Denies contractions, LOF, or VB.  Reports good FM.    /57   Wt 49.4 kg (109 lb)   LMP 2019   BMI 21.29 kg/m²   Cervix: 2/50/-3  Fundal Height (cm): 36 cm  Fetal Heart Rate: 138    4th pregnancy Problems (from 19 to present)     Problem Noted Resolved    Right sided sciatica 10/29/2019 by Daisha Johnston APRN No    Overview Signed 10/29/2019  4:14 PM by Daisha Johnston APRN     Physical Therapy referral amended to Faye Buitrago DPT.          Marginal insertion of umbilical cord affecting management of mother in second trimester 10/29/2019 by Daisha Johnston APRN No    Overview Addendum 2019 10:06 AM by Daisha Johnston APRN     Resolved on US 2019         High-risk pregnancy, second trimester 10/29/2019 by Daisha Johnston APRN No    Overview Addendum 10/29/2019  4:32 PM by Daisha Johnston APRN     A positive / neg antibody / Rubella Immune / GBS unknown  Dating: DELIA: 3/28/2020 by LMP c/w CRL dating scan on 19  Genetics: Declines  Tdap: 28 wks  Flu: Given 10/29  Anatomy: 10/29/19-marginal insertion of cord, posterior marginal previa, echogenic focus-thorax.  1hr Glucola: @ 28 wk  H&H/Plts: @ 28 wk  H&H/Plts: 12.4 / 36.5%/ 216 on 19.   Breastfeeding:?  BC: ?         Multigravida 2019 by Samra Hayes APRN No    Tobacco use during pregnancy, antepartum 2019 by Samra Hayes APRN No    Overview Addendum 2019  9:52 AM by Daisha Johnston APRN     6 cigarettes a day; decreased from a pack a day;         Nausea and vomiting during pregnancy prior to 22 weeks gestation 2019 by Samra Hayes, AC No    Overview Signed 10/29/2019  4:13 PM by Daisha Johnston APRN     Stable         Marginal placenta previa 10/29/2019 by Daisha Johnston APRN 2019 by Van Cordova DO Overview  Addendum 2019 10:05 AM by Daisha Johnston APRN     Resolved; posterior placenta, no previa and cord insertion wnl on U/S on 2019                   A/P: Christiana Aguirre is a 29 y.o.  at 36w6d.  Doing well with appropriately progressing pregnancy.  Fetal kick count and labor precautions given.  - RTC in 1 weeks     Diagnosis Plan   1. 36 weeks gestation of pregnancy  Strep B Screen - Swab, Vaginal/Rectum    Strep B Screen - Swab, Vaginal/Rectum   2. Right sided sciatica     3. Marginal insertion of umbilical cord affecting management of mother in second trimester     4. High-risk pregnancy, second trimester     5. Multigravida     6. Tobacco use during pregnancy, antepartum     7. Nausea and vomiting during pregnancy prior to 22 weeks gestation       Van Cordova DO  3/6/2020  12:02 PM

## 2020-03-07 LAB — GROUP B STREP, DNA: NEGATIVE

## 2020-03-12 ENCOUNTER — HOSPITAL ENCOUNTER (OUTPATIENT)
Facility: HOSPITAL | Age: 30
Discharge: HOME | End: 2020-03-12
Attending: OBSTETRICS & GYNECOLOGY | Admitting: OBSTETRICS & GYNECOLOGY

## 2020-03-12 ENCOUNTER — ROUTINE PRENATAL (OUTPATIENT)
Dept: OBSTETRICS AND GYNECOLOGY | Facility: CLINIC | Age: 30
End: 2020-03-12

## 2020-03-12 VITALS — BODY MASS INDEX: 21.29 KG/M2 | WEIGHT: 109 LBS | DIASTOLIC BLOOD PRESSURE: 64 MMHG | SYSTOLIC BLOOD PRESSURE: 98 MMHG

## 2020-03-12 VITALS
HEART RATE: 94 BPM | BODY MASS INDEX: 21.4 KG/M2 | RESPIRATION RATE: 18 BRPM | OXYGEN SATURATION: 98 % | DIASTOLIC BLOOD PRESSURE: 69 MMHG | HEIGHT: 60 IN | TEMPERATURE: 98.3 F | WEIGHT: 109 LBS | SYSTOLIC BLOOD PRESSURE: 110 MMHG

## 2020-03-12 DIAGNOSIS — O09.92 HIGH-RISK PREGNANCY, SECOND TRIMESTER: ICD-10-CM

## 2020-03-12 DIAGNOSIS — Z64.1 MULTIGRAVIDA: ICD-10-CM

## 2020-03-12 DIAGNOSIS — M54.31 RIGHT SIDED SCIATICA: ICD-10-CM

## 2020-03-12 DIAGNOSIS — O21.9 NAUSEA AND VOMITING DURING PREGNANCY PRIOR TO 22 WEEKS GESTATION: ICD-10-CM

## 2020-03-12 DIAGNOSIS — O99.330 TOBACCO USE DURING PREGNANCY, ANTEPARTUM: ICD-10-CM

## 2020-03-12 DIAGNOSIS — Z3A.37 37 WEEKS GESTATION OF PREGNANCY: Primary | ICD-10-CM

## 2020-03-12 DIAGNOSIS — O43.192 MARGINAL INSERTION OF UMBILICAL CORD AFFECTING MANAGEMENT OF MOTHER IN SECOND TRIMESTER: ICD-10-CM

## 2020-03-12 PROCEDURE — 59025 FETAL NON-STRESS TEST: CPT | Performed by: OBSTETRICS & GYNECOLOGY

## 2020-03-12 PROCEDURE — 0502F SUBSEQUENT PRENATAL CARE: CPT | Performed by: OBSTETRICS & GYNECOLOGY

## 2020-03-12 PROCEDURE — G0463 HOSPITAL OUTPT CLINIC VISIT: HCPCS

## 2020-03-12 PROCEDURE — 59025 FETAL NON-STRESS TEST: CPT

## 2020-03-12 NOTE — PROGRESS NOTES
CC: Prenatal visit    Christiana Aguirre is a 29 y.o.  at 37w5d.  Doing well.  No complaints.  Denies contractions, LOF, or VB.  Reports good FM.    BP 98/64   Wt 49.4 kg (109 lb)   LMP 2019   BMI 21.29 kg/m²   SVE: 350/-2  Fundal Height (cm): 37 cm  Fetal Heart Rate: 145    4th pregnancy Problems (from 19 to present)     Problem Noted Resolved    Right sided sciatica 10/29/2019 by Daisha Johnston APRN No    Overview Signed 10/29/2019  4:14 PM by Daisha Johnston APRN     Physical Therapy referral amended to Faye Buitrago DPT.          Marginal insertion of umbilical cord affecting management of mother in second trimester 10/29/2019 by Daisha Johnston APRN No    Overview Addendum 2019 10:06 AM by Daisha Johnston APRN     Resolved on US 2019         High-risk pregnancy, second trimester 10/29/2019 by Daisha Johnston APRN No    Overview Addendum 10/29/2019  4:32 PM by Daisha Johnston APRN     A positive / neg antibody / Rubella Immune / GBS unknown  Dating: DELIA: 3/28/2020 by LMP c/w CRL dating scan on 19  Genetics: Declines  Tdap: 28 wks  Flu: Given 10/29  Anatomy: 10/29/19-marginal insertion of cord, posterior marginal previa, echogenic focus-thorax.  1hr Glucola: @ 28 wk  H&H/Plts: @ 28 wk  H&H/Plts: 12.4 / 36.5%/ 216 on 19.   Breastfeeding:?  BC: ?         Multigravida 2019 by Samra Hayes APRN No    Tobacco use during pregnancy, antepartum 2019 by Samra Hayes APRN No    Overview Addendum 2019  9:52 AM by Daisha Johnston APRN     6 cigarettes a day; decreased from a pack a day;         Nausea and vomiting during pregnancy prior to 22 weeks gestation 2019 by Samra Hayes APRN No    Overview Signed 10/29/2019  4:13 PM by Daisha Johnston APRN     Stable         Marginal placenta previa 10/29/2019 by Daisha Johnston APRN 2019 by Van Cordova, DO    Teresa  Addendum 2019 10:05 AM by Daisha Johnston APRN     Resolved; posterior placenta, no previa and cord insertion wnl on U/S on 2019                   A/P: Christiana Aguirre is a 29 y.o.  at 37w5d.  Doing well with appropriately progressing pregnancy.  Fetal kick count and labor precautions given.  Patient to return to see me in 1 week sooner as needed.  - RTC in 1 weeks     Diagnosis Plan   1. 37 weeks gestation of pregnancy     2. Right sided sciatica     3. Marginal insertion of umbilical cord affecting management of mother in second trimester     4. High-risk pregnancy, second trimester     5. Multigravida     6. Tobacco use during pregnancy, antepartum     7. Nausea and vomiting during pregnancy prior to 22 weeks gestation       Van Cordova DO  3/12/2020  10:14

## 2020-03-13 NOTE — DISCHARGE INSTRUCTIONS
Return to hospital for bright red bleeding, decreased fetal movement, or water breaks.     Call office or on call OB with any questions or concerns.     Keep next scheduled appointment.

## 2020-03-13 NOTE — NON STRESS TEST
Christiana Aguirre, a  at 37w5d with an DELIA of 3/28/2020, by Last Menstrual Period, was seen at [facility] LABOR DELIVERY for a nonstress test.    Chief Complaint   Patient presents with   • Contractions     Pt to L&D with c/o contractions over the last couple week that have gotten stronger today starting around 1615. Pt denies anyt bleeding or leaking fluid. Pt reports + fetal movement.        Patient Active Problem List   Diagnosis   • Tobacco use during pregnancy, antepartum   • Nausea and vomiting during pregnancy prior to 22 weeks gestation   • Multigravida   • Right sided sciatica   • Marginal insertion of umbilical cord affecting management of mother in second trimester   • High-risk pregnancy, second trimester       Start Time:   Stop Time:     Interpretation A  Nonstress Test Interpretation A: Reactive (20 : Carley Mcfarland, RN)  Comments A: reviewed with YESSI Garza RN (20 : Carley Mcfarland RN)    SVE after 2 hours with no cervical change noted.

## 2020-03-15 ENCOUNTER — ANESTHESIA (OUTPATIENT)
Dept: LABOR AND DELIVERY | Facility: HOSPITAL | Age: 30
End: 2020-03-15

## 2020-03-15 ENCOUNTER — ANESTHESIA EVENT (OUTPATIENT)
Dept: LABOR AND DELIVERY | Facility: HOSPITAL | Age: 30
End: 2020-03-15

## 2020-03-15 ENCOUNTER — HOSPITAL ENCOUNTER (OUTPATIENT)
Facility: HOSPITAL | Age: 30
Discharge: HOME | End: 2020-03-15
Attending: FAMILY MEDICINE | Admitting: FAMILY MEDICINE

## 2020-03-15 ENCOUNTER — HOSPITAL ENCOUNTER (INPATIENT)
Facility: HOSPITAL | Age: 30
LOS: 3 days | Discharge: HOME | End: 2020-03-18
Attending: FAMILY MEDICINE | Admitting: FAMILY MEDICINE

## 2020-03-15 VITALS
DIASTOLIC BLOOD PRESSURE: 69 MMHG | SYSTOLIC BLOOD PRESSURE: 109 MMHG | WEIGHT: 109 LBS | HEIGHT: 60 IN | BODY MASS INDEX: 21.4 KG/M2 | TEMPERATURE: 98.5 F | HEART RATE: 117 BPM

## 2020-03-15 PROBLEM — Z3A.38 38 WEEKS GESTATION OF PREGNANCY: Status: ACTIVE | Noted: 2020-03-15

## 2020-03-15 PROBLEM — O21.9 NAUSEA AND VOMITING DURING PREGNANCY PRIOR TO 22 WEEKS GESTATION: Status: RESOLVED | Noted: 2019-09-02 | Resolved: 2020-03-15

## 2020-03-15 PROBLEM — Z37.9 NORMAL LABOR: Status: ACTIVE | Noted: 2020-03-15

## 2020-03-15 PROBLEM — O09.93 HIGH-RISK PREGNANCY IN THIRD TRIMESTER: Status: ACTIVE | Noted: 2019-10-29

## 2020-03-15 PROBLEM — O43.192 MARGINAL INSERTION OF UMBILICAL CORD AFFECTING MANAGEMENT OF MOTHER IN SECOND TRIMESTER: Status: RESOLVED | Noted: 2019-10-29 | Resolved: 2020-03-15

## 2020-03-15 LAB
AMPHET+METHAMPHET UR QL: NEGATIVE
AMPHETAMINES UR QL: NEGATIVE
BARBITURATES UR QL SCN: NEGATIVE
BENZODIAZ UR QL SCN: NEGATIVE
BUPRENORPHINE SERPL-MCNC: NEGATIVE NG/ML
CANNABINOIDS SERPL QL: NEGATIVE
COCAINE UR QL: NEGATIVE
DEPRECATED RDW RBC AUTO: 40.7 FL (ref 37–54)
ERYTHROCYTE [DISTWIDTH] IN BLOOD BY AUTOMATED COUNT: 12.6 % (ref 12.3–15.4)
HCT VFR BLD AUTO: 31 % (ref 34–46.6)
HGB BLD-MCNC: 10.7 G/DL (ref 12–15.9)
Lab: NORMAL
MCH RBC QN AUTO: 31 PG (ref 26.6–33)
MCHC RBC AUTO-ENTMCNC: 34.5 G/DL (ref 31.5–35.7)
MCV RBC AUTO: 89.9 FL (ref 79–97)
METHADONE UR QL SCN: NEGATIVE
OPIATES UR QL: NEGATIVE
OXYCODONE UR QL SCN: NEGATIVE
PCP UR QL SCN: NEGATIVE
PLATELET # BLD AUTO: 305 10*3/MM3 (ref 140–450)
PMV BLD AUTO: 10.4 FL (ref 6–12)
PROPOXYPH UR QL: NEGATIVE
RBC # BLD AUTO: 3.45 10*6/MM3 (ref 3.77–5.28)
TRICYCLICS UR QL SCN: NEGATIVE
WBC NRBC COR # BLD: 19.08 10*3/MM3 (ref 3.4–10.8)

## 2020-03-15 PROCEDURE — 86900 BLOOD TYPING SEROLOGIC ABO: CPT | Performed by: FAMILY MEDICINE

## 2020-03-15 PROCEDURE — 59025 FETAL NON-STRESS TEST: CPT | Performed by: FAMILY MEDICINE

## 2020-03-15 PROCEDURE — 86901 BLOOD TYPING SEROLOGIC RH(D): CPT | Performed by: FAMILY MEDICINE

## 2020-03-15 PROCEDURE — 86850 RBC ANTIBODY SCREEN: CPT | Performed by: FAMILY MEDICINE

## 2020-03-15 PROCEDURE — 88305 TISSUE EXAM BY PATHOLOGIST: CPT | Performed by: PATHOLOGY

## 2020-03-15 PROCEDURE — C1755 CATHETER, INTRASPINAL: HCPCS | Performed by: NURSE ANESTHETIST, CERTIFIED REGISTERED

## 2020-03-15 PROCEDURE — 88307 TISSUE EXAM BY PATHOLOGIST: CPT | Performed by: FAMILY MEDICINE

## 2020-03-15 PROCEDURE — 59025 FETAL NON-STRESS TEST: CPT

## 2020-03-15 PROCEDURE — 80306 DRUG TEST PRSMV INSTRMNT: CPT | Performed by: FAMILY MEDICINE

## 2020-03-15 PROCEDURE — 25010000003 LIDOCAINE 1 % SOLUTION: Performed by: NURSE ANESTHETIST, CERTIFIED REGISTERED

## 2020-03-15 PROCEDURE — G0463 HOSPITAL OUTPT CLINIC VISIT: HCPCS

## 2020-03-15 PROCEDURE — 85027 COMPLETE CBC AUTOMATED: CPT | Performed by: FAMILY MEDICINE

## 2020-03-15 RX ORDER — SODIUM CHLORIDE 0.9 % (FLUSH) 0.9 %
3 SYRINGE (ML) INJECTION EVERY 12 HOURS SCHEDULED
Status: DISCONTINUED | OUTPATIENT
Start: 2020-03-15 | End: 2020-03-16 | Stop reason: HOSPADM

## 2020-03-15 RX ORDER — LIDOCAINE HYDROCHLORIDE AND EPINEPHRINE 15; 5 MG/ML; UG/ML
INJECTION, SOLUTION EPIDURAL AS NEEDED
Status: DISCONTINUED | OUTPATIENT
Start: 2020-03-15 | End: 2020-03-16 | Stop reason: SURG

## 2020-03-15 RX ORDER — LIDOCAINE HYDROCHLORIDE 20 MG/ML
INJECTION, SOLUTION INTRAVENOUS AS NEEDED
Status: DISCONTINUED | OUTPATIENT
Start: 2020-03-15 | End: 2020-03-16 | Stop reason: SURG

## 2020-03-15 RX ORDER — BUPIVACAINE HYDROCHLORIDE 2.5 MG/ML
INJECTION, SOLUTION EPIDURAL; INFILTRATION; INTRACAUDAL AS NEEDED
Status: DISCONTINUED | OUTPATIENT
Start: 2020-03-15 | End: 2020-03-16 | Stop reason: SURG

## 2020-03-15 RX ORDER — SODIUM CHLORIDE, SODIUM LACTATE, POTASSIUM CHLORIDE, CALCIUM CHLORIDE 600; 310; 30; 20 MG/100ML; MG/100ML; MG/100ML; MG/100ML
INJECTION, SOLUTION INTRAVENOUS
Status: COMPLETED
Start: 2020-03-15 | End: 2020-03-16

## 2020-03-15 RX ORDER — CARBOPROST TROMETHAMINE 250 UG/ML
INJECTION, SOLUTION INTRAMUSCULAR
Status: DISPENSED
Start: 2020-03-15 | End: 2020-03-16

## 2020-03-15 RX ORDER — MISOPROSTOL 200 UG/1
800 TABLET ORAL AS NEEDED
Status: DISCONTINUED | OUTPATIENT
Start: 2020-03-15 | End: 2020-03-16 | Stop reason: HOSPADM

## 2020-03-15 RX ORDER — SODIUM CHLORIDE, SODIUM LACTATE, POTASSIUM CHLORIDE, CALCIUM CHLORIDE 600; 310; 30; 20 MG/100ML; MG/100ML; MG/100ML; MG/100ML
125 INJECTION, SOLUTION INTRAVENOUS CONTINUOUS
Status: DISCONTINUED | OUTPATIENT
Start: 2020-03-15 | End: 2020-03-18 | Stop reason: HOSPADM

## 2020-03-15 RX ORDER — LIDOCAINE HYDROCHLORIDE 10 MG/ML
INJECTION, SOLUTION INFILTRATION; PERINEURAL AS NEEDED
Status: DISCONTINUED | OUTPATIENT
Start: 2020-03-15 | End: 2020-03-16 | Stop reason: SURG

## 2020-03-15 RX ORDER — SODIUM CHLORIDE 0.9 % (FLUSH) 0.9 %
10 SYRINGE (ML) INJECTION AS NEEDED
Status: DISCONTINUED | OUTPATIENT
Start: 2020-03-15 | End: 2020-03-16 | Stop reason: HOSPADM

## 2020-03-15 RX ORDER — CARBOPROST TROMETHAMINE 250 UG/ML
250 INJECTION, SOLUTION INTRAMUSCULAR AS NEEDED
Status: DISCONTINUED | OUTPATIENT
Start: 2020-03-15 | End: 2020-03-16 | Stop reason: HOSPADM

## 2020-03-15 RX ORDER — OXYTOCIN/0.9 % SODIUM CHLORIDE 30/500 ML
PLASTIC BAG, INJECTION (ML) INTRAVENOUS
Status: COMPLETED
Start: 2020-03-15 | End: 2020-03-15

## 2020-03-15 RX ORDER — METHYLERGONOVINE MALEATE 0.2 MG/ML
INJECTION INTRAVENOUS
Status: DISPENSED
Start: 2020-03-15 | End: 2020-03-16

## 2020-03-15 RX ORDER — METHYLERGONOVINE MALEATE 0.2 MG/ML
200 INJECTION INTRAVENOUS ONCE AS NEEDED
Status: DISCONTINUED | OUTPATIENT
Start: 2020-03-15 | End: 2020-03-16 | Stop reason: HOSPADM

## 2020-03-15 RX ORDER — LIDOCAINE HYDROCHLORIDE 10 MG/ML
5 INJECTION, SOLUTION EPIDURAL; INFILTRATION; INTRACAUDAL; PERINEURAL AS NEEDED
Status: DISCONTINUED | OUTPATIENT
Start: 2020-03-15 | End: 2020-03-16 | Stop reason: HOSPADM

## 2020-03-15 RX ADMIN — Medication 10 ML/HR: at 22:53

## 2020-03-15 RX ADMIN — LIDOCAINE HYDROCHLORIDE AND EPINEPHRINE 3 ML: 15; 5 INJECTION, SOLUTION EPIDURAL at 22:41

## 2020-03-15 RX ADMIN — SODIUM CHLORIDE, POTASSIUM CHLORIDE, SODIUM LACTATE AND CALCIUM CHLORIDE 1000 ML: 600; 310; 30; 20 INJECTION, SOLUTION INTRAVENOUS at 22:08

## 2020-03-15 RX ADMIN — OXYTOCIN-SODIUM CHLORIDE 0.9% IV SOLN 30 UNIT/500ML 30 UNITS: 30-0.9/5 SOLUTION at 23:47

## 2020-03-15 RX ADMIN — LIDOCAINE HYDROCHLORIDE 5 ML: 20 INJECTION, SOLUTION INTRAVENOUS at 22:47

## 2020-03-15 RX ADMIN — BUPIVACAINE HYDROCHLORIDE 5 ML: 2.5 INJECTION, SOLUTION EPIDURAL; INFILTRATION; INTRACAUDAL; PERINEURAL at 22:47

## 2020-03-15 RX ADMIN — SODIUM CHLORIDE, POTASSIUM CHLORIDE, SODIUM LACTATE AND CALCIUM CHLORIDE 999 ML/HR: 600; 310; 30; 20 INJECTION, SOLUTION INTRAVENOUS at 22:58

## 2020-03-15 RX ADMIN — LIDOCAINE HYDROCHLORIDE 5 ML: 10 INJECTION, SOLUTION INFILTRATION; PERINEURAL at 22:32

## 2020-03-15 NOTE — NURSING NOTE
Discussed discharge with patient and family. Encouraged patient to return when contractions increased in frequency and intensity. Return for decreased fetal movement, if water breaks or you suspect it has, bright red vaginal bleeding. Patient to soak in a warm bath, rest, may take Tylenol and hydrate with water.    Patient verbalized understanding.

## 2020-03-16 PROBLEM — Z3A.38 38 WEEKS GESTATION OF PREGNANCY: Status: RESOLVED | Noted: 2020-03-15 | Resolved: 2020-03-16

## 2020-03-16 PROBLEM — O41.1230 CHORIOAMNIONITIS IN THIRD TRIMESTER: Status: ACTIVE | Noted: 2020-03-16

## 2020-03-16 PROBLEM — Z37.9 NORMAL LABOR: Status: RESOLVED | Noted: 2020-03-15 | Resolved: 2020-03-16

## 2020-03-16 PROBLEM — O09.93 HIGH-RISK PREGNANCY IN THIRD TRIMESTER: Status: RESOLVED | Noted: 2019-10-29 | Resolved: 2020-03-16

## 2020-03-16 LAB
ABO GROUP BLD: NORMAL
BLD GP AB SCN SERPL QL: NEGATIVE
RH BLD: POSITIVE
T&S EXPIRATION DATE: NORMAL

## 2020-03-16 PROCEDURE — 25010000002 GENTAMICIN PER 80 MG: Performed by: FAMILY MEDICINE

## 2020-03-16 PROCEDURE — 59400 OBSTETRICAL CARE: CPT | Performed by: FAMILY MEDICINE

## 2020-03-16 PROCEDURE — 51702 INSERT TEMP BLADDER CATH: CPT

## 2020-03-16 PROCEDURE — C1755 CATHETER, INTRASPINAL: HCPCS

## 2020-03-16 PROCEDURE — 25010000002 AMPICILLIN PER 500 MG: Performed by: FAMILY MEDICINE

## 2020-03-16 RX ORDER — SODIUM CHLORIDE 0.9 % (FLUSH) 0.9 %
1-10 SYRINGE (ML) INJECTION AS NEEDED
Status: DISCONTINUED | OUTPATIENT
Start: 2020-03-16 | End: 2020-03-18 | Stop reason: HOSPADM

## 2020-03-16 RX ORDER — BISACODYL 10 MG
10 SUPPOSITORY, RECTAL RECTAL DAILY PRN
Status: DISCONTINUED | OUTPATIENT
Start: 2020-03-17 | End: 2020-03-18 | Stop reason: HOSPADM

## 2020-03-16 RX ORDER — ACETAMINOPHEN 500 MG
TABLET ORAL
Status: COMPLETED
Start: 2020-03-16 | End: 2020-03-16

## 2020-03-16 RX ORDER — IBUPROFEN 800 MG/1
800 TABLET ORAL EVERY 6 HOURS SCHEDULED
Status: DISCONTINUED | OUTPATIENT
Start: 2020-03-16 | End: 2020-03-18 | Stop reason: HOSPADM

## 2020-03-16 RX ORDER — LANOLIN 100 %
OINTMENT (GRAM) TOPICAL
Status: DISCONTINUED | OUTPATIENT
Start: 2020-03-16 | End: 2020-03-18 | Stop reason: HOSPADM

## 2020-03-16 RX ORDER — ACETAMINOPHEN 500 MG
1000 TABLET ORAL EVERY 8 HOURS
Status: DISCONTINUED | OUTPATIENT
Start: 2020-03-16 | End: 2020-03-18 | Stop reason: HOSPADM

## 2020-03-16 RX ORDER — PRENATAL VIT/IRON FUM/FOLIC AC 27MG-0.8MG
1 TABLET ORAL DAILY
Status: DISCONTINUED | OUTPATIENT
Start: 2020-03-17 | End: 2020-03-18 | Stop reason: HOSPADM

## 2020-03-16 RX ADMIN — IBUPROFEN 800 MG: 800 TABLET ORAL at 07:35

## 2020-03-16 RX ADMIN — SODIUM CHLORIDE, POTASSIUM CHLORIDE, SODIUM LACTATE AND CALCIUM CHLORIDE 125 ML/HR: 600; 310; 30; 20 INJECTION, SOLUTION INTRAVENOUS at 17:07

## 2020-03-16 RX ADMIN — SODIUM CHLORIDE, POTASSIUM CHLORIDE, SODIUM LACTATE AND CALCIUM CHLORIDE 125 ML/HR: 600; 310; 30; 20 INJECTION, SOLUTION INTRAVENOUS at 07:31

## 2020-03-16 RX ADMIN — IBUPROFEN 800 MG: 800 TABLET ORAL at 18:34

## 2020-03-16 RX ADMIN — ACETAMINOPHEN 1000 MG: 500 TABLET ORAL at 00:41

## 2020-03-16 RX ADMIN — AMPICILLIN SODIUM 2 G: 2 INJECTION, POWDER, FOR SOLUTION INTRAMUSCULAR; INTRAVENOUS at 15:35

## 2020-03-16 RX ADMIN — GENTAMICIN SULFATE 250 MG: 40 INJECTION, SOLUTION INTRAMUSCULAR; INTRAVENOUS at 03:15

## 2020-03-16 RX ADMIN — AMPICILLIN SODIUM 2 G: 2 INJECTION, POWDER, FOR SOLUTION INTRAMUSCULAR; INTRAVENOUS at 07:39

## 2020-03-16 RX ADMIN — SODIUM CHLORIDE, POTASSIUM CHLORIDE, SODIUM LACTATE AND CALCIUM CHLORIDE 125 ML/HR: 600; 310; 30; 20 INJECTION, SOLUTION INTRAVENOUS at 00:41

## 2020-03-16 RX ADMIN — AMPICILLIN SODIUM 2 G: 2 INJECTION, POWDER, FOR SOLUTION INTRAMUSCULAR; INTRAVENOUS at 00:44

## 2020-03-16 RX ADMIN — ACETAMINOPHEN 1000 MG: 500 TABLET ORAL at 17:08

## 2020-03-16 RX ADMIN — ACETAMINOPHEN 1000 MG: 500 TABLET ORAL at 09:43

## 2020-03-16 RX ADMIN — AMPICILLIN SODIUM 2 G: 2 INJECTION, POWDER, FOR SOLUTION INTRAMUSCULAR; INTRAVENOUS at 18:35

## 2020-03-16 RX ADMIN — IBUPROFEN 800 MG: 800 TABLET ORAL at 12:12

## 2020-03-16 NOTE — LACTATION NOTE
This note was copied from a baby's chart.  Infant latched on and breast fed very well. Mother is experienced with breastfeeding. Mother reports no pain. Infant had audible swallowing for 20 minutes. Mother reports that she also has pump at home. Recommend feeding on demand.

## 2020-03-16 NOTE — ANESTHESIA PREPROCEDURE EVALUATION
Anesthesia Evaluation     Patient summary reviewed and Nursing notes reviewed   NPO Solid Status: > 2 hours  NPO Liquid Status: > 2 hours           Airway   Mallampati: II  TM distance: >3 FB  Neck ROM: full  No difficulty expected  Dental - normal exam     Pulmonary - normal exam   (+) a smoker Current Smoked day of surgery,   Cardiovascular - negative cardio ROS and normal exam        Neuro/Psych  (+) numbness,     GI/Hepatic/Renal/Endo - negative ROS     Musculoskeletal         ROS comment: Right sided sciatica  Abdominal  - normal exam   Substance History - negative use     OB/GYN    (+) Pregnant,         Other - negative ROS                       Anesthesia Plan    ASA 2 - emergent     epidural       Anesthetic plan, all risks, benefits, and alternatives have been provided, discussed and informed consent has been obtained with: patient and spouse/significant other.

## 2020-03-16 NOTE — ANESTHESIA POSTPROCEDURE EVALUATION
Patient: Christiana Aguirre    Procedure Summary     Date:  03/15/20 Room / Location:      Anesthesia Start:  2228 Anesthesia Stop:  03/16/20 0019    Procedure:  LABOR ANALGESIA Diagnosis:      Scheduled Providers:   Provider:  Aiden Hobbs CRNA    Anesthesia Type:  epidural ASA Status:  2 - Emergent          Anesthesia Type: epidural    Vitals  Vitals Value Taken Time   /65 3/16/2020 12:09 AM   Temp 99.3 °F (37.4 °C) 3/15/2020 11:29 PM   Pulse 99 3/16/2020 12:09 AM   Resp 22 3/15/2020 11:54 PM   SpO2 100 % 3/15/2020 11:12 PM   Vitals shown include unvalidated device data.        Post Anesthesia Care and Evaluation    Patient location during evaluation: bedside  Patient participation: complete - patient participated  Level of consciousness: awake and awake and alert  Pain score: 0  Pain management: satisfactory to patient  Airway patency: patent  Anesthetic complications: No anesthetic complications  PONV Status: none  Cardiovascular status: acceptable and stable  Respiratory status: acceptable, room air and spontaneous ventilation  Hydration status: acceptable

## 2020-03-16 NOTE — NON STRESS TEST
Christiana Aguirre, a  at 38w1d with an DELIA of 3/28/2020, by Last Menstrual Period, was seen at [facility] LABOR DELIVERY for a nonstress test.    Chief Complaint   Patient presents with   • Contractions     possible leaking of fluid, vaginal spotting, decreased fetal movement       Patient Active Problem List   Diagnosis   • Tobacco use during pregnancy, antepartum   • Nausea and vomiting during pregnancy prior to 22 weeks gestation   • Multigravida   • Right sided sciatica   • Marginal insertion of umbilical cord affecting management of mother in second trimester   • High-risk pregnancy, second trimester       Start Time:   Stop Time:     Interpretation A  Nonstress Test Interpretation A: Reactive (03/15/20 1836 : Rosa Van, RN)  Comments A: reviewed with GORDON Gross RN  (03/15/20 1836 : Rosa Van, RN)    sve x3 no significant change, ready for discharge

## 2020-03-16 NOTE — ANESTHESIA PROCEDURE NOTES
Labor Epidural      Patient reassessed immediately prior to procedure    Patient location during procedure: OB  Start Time: 3/15/2020 10:32 PM  Stop Time: 3/15/2020 10:41 PM  Indication:at surgeon's request  Performed By  CRNA: Aiden Hobbs CRNA  Preanesthetic Checklist  Completed: patient identified, site marked, surgical consent, pre-op evaluation, timeout performed, IV checked, risks and benefits discussed and monitors and equipment checked  Prep:  Pt Position:sitting  Sterile Tech:cap, gloves, mask and sterile barrier  Prep:povidone-iodine 7.5% surgical scrub  Monitoring:blood pressure monitoring, continuous pulse oximetry and EKG  Epidural Block Procedure:  Approach:midline  Guidance:landmark technique  Location:L3-L4  Needle Type:Tuohy  Needle Gauge:17 G  Loss of Resistance Medium: saline  Loss of Resistance: 5cm  Cath Depth at skin:10 cm  Paresthesia: none  Aspiration:negative  Test Dose:negative  Number of Attempts: 1  Post Assessment:  Dressing:occlusive dressing applied and secured with tape  Pt Tolerance:patient tolerated the procedure well with no apparent complications  Complications:no

## 2020-03-16 NOTE — PLAN OF CARE
Problem: Patient Care Overview  Goal: Plan of Care Review  Outcome: Ongoing (interventions implemented as appropriate)  Flowsheets (Taken 3/16/2020 5478)  Progress: improving  Plan of Care Reviewed With: patient  Outcome Summary: Ambulating in room, pumping and sending breast milk to NICU, receiving antibiotics.

## 2020-03-16 NOTE — L&D DELIVERY NOTE
[facility]  Vaginal Delivery Note    Delivery     Delivery: Vaginal, Spontaneous     YOB: 2019    Time of Birth:  Gestational Age 11:42 PM   38w2d     Anesthesia: Epidural     Delivering clinician: Amanda Eddy    Forceps?   No   Vacuum? No    Shoulder dystocia present: No        Delivery narrative:  On 3/16/2020 Christiana Aguirre at , delivered a viable Female  infant to a sterile field with Epidural  anesthesia from ALISE position. Left anterior shoulder delivered without difficulty followed by right posterior shoulder.  Nuchal cord was encountered, not reducible at the perineum; Body was delivered with gentle traction, nuchal cord reduced, infant placed on mother's abdomen, WPDS (warmed, positioned, dried, stimulated), bulb suctioned. Cord clamped and cut after 1 minute of delayed clamping. Cord blood collected. Placenta with 3 vessel cord delivered spontaneous intact with the appearance suspicious for chorioamnionitis. 20 units of Pitocin given. Inspection revealed no tears.   mL. Infants weight 6 lb 6.3 oz (2900 g) . Apgars were 8   and 9   at one and five minutes, respectively. Infant and mother to recover in room.         Infant    Findings: female  infant     Infant observations: Weight: 2900 g (6 lb 6.3 oz)   Length: 18.898  in  Observations/Comments:         Apgars: 8   @ 1 minute /    9   @ 5 minutes         Placenta, Cord, and Fluid    Placenta delivered  Spontaneous  at   3/15/2020 11:47 PM     Cord: 3 vessels  present.   Nuchal Cord?  yes; Number of nuchal loops present:  1    Cord blood obtained: Yes    Cord gases obtained:  No    Cord gas results: Venous:  No results found for: PHCVEN    Arterial:  No results found for: PHCART     Repair    Episiotomy: None     No    Lacerations: No   Estimated Blood Loss: Est. Blood Loss (mL): 250 mL (20 0009)           Complications  Presumed chorioamnionitis    Disposition  Mother to Mother Baby/Postpartum  in  stable condition currently.  Baby to NICU  in stable condition for prophylactic antibiotics.      Amanda Eddy MD  03/16/20  07:02

## 2020-03-16 NOTE — PROGRESS NOTES
[facility]  Christiana Aguirre  : 1990  MRN: [id number]  CSN: [id number]    Postpartum Day #1  Subjective   Patient was sleeping when I entered the room.  Delivery was complicated by chorioamnionitis that occurred just after delivery.  Patient is currently on antibiotics for this.  Describes some abdominal pain likely from the infection, will continue treatment till patient is 24 hours afebrile.  Urinating without difficulty, and ambulating without difficulty.  Has not eaten since delivery.     Objective     Min/max vitals past 24 hours:   Temp  Min: 98.4 °F (36.9 °C)  Max: 100.4 °F (38 °C)  BP  Min: 90/54  Max: 136/64  Pulse  Min: 84  Max: 124  Pulse  Min: 84  Max: 124        Abdomen:  Fundus firm and tender to palpation   Calves: Nontender, no cords palpable   Pelvic: deferred     Lab Results   Component Value Date    WBC 19.08 (H) 03/15/2020    HGB 10.7 (L) 03/15/2020    HCT 31.0 (L) 03/15/2020    MCV 89.9 03/15/2020     03/15/2020    ABORH A Rh Positive 2016    RH Positive 03/15/2020    HEPBSAG Non-Reactive 2019        Assessment   1. Postpartum Day #1 S/P vaginal delivery, overall stable and recovering appropriately at this time.  Patient is currently afebrile, still with some fundal tenderness indicating continued [place] will continue antibiotics until patient is 24 hours afebrile.     Plan   1. Continue routine postpartum care  2. Continue IV antibiotics  3. Encourage ambulation and breast-feeding  4. Regular diet  5. Continue inpatient management at this time          This document has been electronically signed by Van Cordova DO on 2020 06:59

## 2020-03-16 NOTE — H&P
[facility] - OB History and Physical  ?  ?  Name: Christiana Aguirre   Age: 29 y.o.       Name of Clinic: [facility]   OB Provider: Van Cordova DO    CC:   Chief Complaint   Patient presents with   • Contractions       Onset of Labor: 3/15/2020  ROM: None    HPI:   29 y.o.  38w1d presents complaining of contractions that have become more regular and intense.  She was seen earlier today on L&D and had no cervical change >3hrs so she was sent home, at that time cervix was 4/80 per RN but she was not in a good contraction pattern.  She is now 7cm per RN.  Reports good fetal movement, no LOF, no VB. She is having a girl, plans to formula feed and desires epidural.   ??  LMP: Patient's last menstrual period was 2019. EDC: Estimated Date of Delivery: 3/28/20 based on LMP c/w 1TUS (19 at 10w1d)     Placental Location: posterior    Prenatal Course:   4th pregnancy Problems (from 19 to present)     Problem Noted Resolved    Right sided sciatica 10/29/2019 by Daisha Johnston APRN No    Overview Signed 10/29/2019  4:14 PM by Daisha Johnston APRN     Physical Therapy referral amended to Faye Buitrago DPT.          Marginal insertion of umbilical cord affecting management of mother in second trimester 10/29/2019 by Daisha Johnston APRN No    Overview Addendum 2019 10:06 AM by Daisha Johnston APRN     Resolved on US 2019         High-risk pregnancy, second trimester 10/29/2019 by Daisha Johnston APRN No    Overview Addendum 10/29/2019  4:32 PM by Daisha Johnston APRN     A positive / neg antibody / Rubella Immune / GBS unknown  Dating: DELIA: 3/28/2020 by LMP c/w CRL dating scan on 19  Genetics: Declines  Tdap: 28 wks  Flu: Given 10/29  Anatomy: 10/29/19-marginal insertion of cord, posterior marginal previa, echogenic focus-thorax.  1hr Glucola: @ 28 wk  H&H/Plts: @ 28 wk  H&H/Plts: 12.4 / 36.5%/ 216 on 19.    Breastfeeding:?  BC: ?         Multigravida 2019 by Samra Hayes APRN No    Tobacco use during pregnancy, antepartum 2019 by Samra Hayes APRN No    Overview Addendum 2019  9:52 AM by Daisha Johnston APRN     6 cigarettes a day; decreased from a pack a day;         Nausea and vomiting during pregnancy prior to 22 weeks gestation 2019 by Samra Hayes APRN No    Overview Signed 10/29/2019  4:13 PM by Daisha Johnston APRN     Stable         Marginal placenta previa 10/29/2019 by Daisha Johnston APRN 2019 by Van Cordova DO    Overview Addendum 2019 10:05 AM by Daisha Johnston APRN     Resolved; posterior placenta, no previa and cord insertion wnl on U/S on 2019                   Prior OB Hx:   OB History    Para Term  AB Living   4 2 2   1 2   SAB TAB Ectopic Molar Multiple Live Births             2      # Outcome Date GA Lbr Lee/2nd Weight Sex Delivery Anes PTL Lv   4 Current            3 AB 19 5w0d    SAB      2 Term 16 40w0d  3345 g (7 lb 6 oz) M Vag-Spont EPI N YINKA   1 Term 10/03/13 40w0d  3062 g (6 lb 12 oz) F Vag-Spont EPI N YINKA     ?  Prenatal Labs:   External Prenatal Results     Pregnancy Outside Results - Transcribed From Office Records - See Scanned Records For Details     Test Value Date Time    Hgb 10.4 g/dL 19 1134      12.4 g/dL 19 1118    Hct 30.3 % 19 1134      36.5 % 19 1118    ABO A  19 1118    Rh Positive  19 1118    Antibody Screen Negative  19 1118    Glucose Fasting GTT       Glucose Tolerance Test 1 hour       Glucose Tolerance Test 3 hour       Gonorrhea (discrete) Negative  19 1118    Chlamydia (discrete) Negative  19 1118    RPR Non-Reactive  19 1118    VDRL       Syphilis Antibody       Rubella Positive  19 1118    HBsAg Non-Reactive  19 1118    Herpes Simplex Virus PCR       Herpes  Simplex VIrus Culture       HIV Non-Reactive  08/27/19 1118    Hep C RNA Quant PCR       Hep C Antibody Non-Reactive  08/27/19 1118    AFP       Group B Strep Negative  03/06/20 1049    GBS Susceptibility to Clindamycin       GBS Susceptibility to Erythromycin       Fetal Fibronectin       Genetic Testing, Maternal Blood             Drug Screening     Test Value Date Time    Urine Drug Screen       Amphetamine Screen Negative  08/27/19 1118    Barbiturate Screen Negative  08/27/19 1118    Benzodiazepine Screen Negative  08/27/19 1118    Methadone Screen Negative  08/27/19 1118    Phencyclidine Screen Negative  08/27/19 1118    Opiates Screen Negative  08/27/19 1118    THC Screen Negative  08/27/19 1118    Cocaine Screen       Propoxyphene Screen Negative  08/27/19 1118    Buprenorphine Screen Negative  08/27/19 1118    Methamphetamine Screen       Oxycodone Screen Negative  08/27/19 1118    Tricyclic Antidepressants Screen Negative  08/27/19 1118                Past Medical History:   Diagnosis Date   • Anemia 01/08/2016    IRON INFUSION AFTER DELIVERY   • Right sciatic nerve pain    • Varicella      Past Surgical History:   Procedure Laterality Date   • TEETH EXTRACTION  06/2017     Family History   Problem Relation Age of Onset   • Diverticulitis Mother    • DEBBY disease Mother      Social History     Tobacco Use   • Smoking status: Current Every Day Smoker     Packs/day: 0.50     Types: Cigarettes   • Smokeless tobacco: Never Used   Substance Use Topics   • Alcohol use: No     Frequency: Never   • Drug use: No       No Known Allergies     Prior to Admission medications    Medication Sig Start Date End Date Taking? Authorizing Provider   docusate sodium (COLACE) 100 MG capsule Take 1 capsule by mouth 2 (Two) Times a Day. 12/17/19  Yes Daisha Johnston APRN   ferrous sulfate 325 (65 FE) MG tablet Take 1 tablet by mouth 3 (Three) Times a Day With Meals. 12/17/19  Yes Daisha Johnston APRN   Prenatal MV & Min  w/FA-DHA (PRENATAL ADULT GUMMY/DHA/FA PO) Take  by mouth.   Yes Provider, Jocelyne, MD   ondansetron (ZOFRAN) 4 MG tablet Take 1 tablet by mouth Every 8 (Eight) Hours As Needed for Nausea or Vomiting. 8/27/19   Samra Hayes APRN       Review of Systems   Constitutional: Negative for chills and fever.   Eyes: Negative for photophobia and visual disturbance.   Respiratory: Negative for cough and shortness of breath.    Cardiovascular: Negative for chest pain, palpitations and leg swelling.   Gastrointestinal: Negative for abdominal pain, diarrhea, nausea and vomiting.   Genitourinary: Negative for dysuria, vaginal bleeding and vaginal discharge.   Skin: Negative for color change and rash.   Neurological: Negative for dizziness, light-headedness and headache.   Hematological: Negative for adenopathy. Does not bruise/bleed easily.   Psychiatric/Behavioral: Negative for depressed mood. The patient is not nervous/anxious.    All other systems reviewed and are negative.    Physical Exam:  Vitals:/64 (BP Location: Right arm, Patient Position: Lying)   Pulse (!) 123   Temp 98.4 °F (36.9 °C) (Oral)   Resp 22   LMP 06/22/2019   SpO2 98%     General:  Alert, cooperative, appears stated age, no distress    Skin:  Warm, well perfused no rashes   Lungs:  clear to auscultation bilaterally   Heart:  Regular rate and rhythm or S1S2 present   GI: Soft; positive bowel sounds; gravid uterus; FHT present   Extremities: no edema    Reflexes: 2+, symmetrical   Fundal Height: size equals dates   Presentation: cephalic   FHT:  bpm, Variability: Moderate (6-25bpm), Accelerations: 2 15x15 accels present in 20 minutes, Decelerations: Absent  Category 1 tracing   Contractions: Frequency:  Every 2-4 minutes    Cervix: ?7-8/100 BBOW per RN    ?  Current Labs:  Lab Results   Component Value Date    WBC 9.32 12/17/2019    HGB 10.4 (L) 12/17/2019    HCT 30.3 (L) 12/17/2019    MCV 92.9 12/17/2019      2019       A/P: 29 y.o.  38w1d admit for active labor.  - GBS negative  - T&S, CBC, UDS  - cEFM/Supreme  - CLD  - allow epidural and plan for AROM.  - anticipate     ?Christiana and I have discussed pain goals for this hospitalization after reviewing her current clinical condition, medical history and prior pain experiences.  The goal is to keep her pain controlled.  She may have an epidural for labor analgesia; postpartum pain control with scheduled Tylenol/Motrin  ?  Signature  Amanda Eddy MD  [facility] [place] [address]  PH: [phone]      This document has been electronically signed by Amanda Eddy MD on March 15, 2020 22:16

## 2020-03-17 LAB
BASOPHILS # BLD AUTO: 0.06 10*3/MM3 (ref 0–0.2)
BASOPHILS NFR BLD AUTO: 0.6 % (ref 0–1.5)
DEPRECATED RDW RBC AUTO: 43 FL (ref 37–54)
EOSINOPHIL # BLD AUTO: 0.13 10*3/MM3 (ref 0–0.4)
EOSINOPHIL NFR BLD AUTO: 1.2 % (ref 0.3–6.2)
ERYTHROCYTE [DISTWIDTH] IN BLOOD BY AUTOMATED COUNT: 13 % (ref 12.3–15.4)
HCT VFR BLD AUTO: 27.9 % (ref 34–46.6)
HGB BLD-MCNC: 9.2 G/DL (ref 12–15.9)
IMM GRANULOCYTES # BLD AUTO: 0.02 10*3/MM3 (ref 0–0.05)
IMM GRANULOCYTES NFR BLD AUTO: 0.2 % (ref 0–0.5)
LAB AP CASE REPORT: NORMAL
LYMPHOCYTES # BLD AUTO: 2.64 10*3/MM3 (ref 0.7–3.1)
LYMPHOCYTES NFR BLD AUTO: 24.5 % (ref 19.6–45.3)
MCH RBC QN AUTO: 30.6 PG (ref 26.6–33)
MCHC RBC AUTO-ENTMCNC: 33 G/DL (ref 31.5–35.7)
MCV RBC AUTO: 92.7 FL (ref 79–97)
MONOCYTES # BLD AUTO: 0.69 10*3/MM3 (ref 0.1–0.9)
MONOCYTES NFR BLD AUTO: 6.4 % (ref 5–12)
NEUTROPHILS # BLD AUTO: 7.23 10*3/MM3 (ref 1.7–7)
NEUTROPHILS NFR BLD AUTO: 67.1 % (ref 42.7–76)
NRBC BLD AUTO-RTO: 0 /100 WBC (ref 0–0.2)
PATH REPORT.FINAL DX SPEC: NORMAL
PATH REPORT.GROSS SPEC: NORMAL
PLATELET # BLD AUTO: 267 10*3/MM3 (ref 140–450)
PMV BLD AUTO: 10.8 FL (ref 6–12)
RBC # BLD AUTO: 3.01 10*6/MM3 (ref 3.77–5.28)
WBC NRBC COR # BLD: 10.77 10*3/MM3 (ref 3.4–10.8)

## 2020-03-17 PROCEDURE — 99024 POSTOP FOLLOW-UP VISIT: CPT | Performed by: OBSTETRICS & GYNECOLOGY

## 2020-03-17 PROCEDURE — 85025 COMPLETE CBC W/AUTO DIFF WBC: CPT | Performed by: FAMILY MEDICINE

## 2020-03-17 RX ADMIN — PRENATAL VIT W/ FE FUMARATE-FA TAB 27-0.8 MG 1 TABLET: 27-0.8 TAB at 09:28

## 2020-03-17 RX ADMIN — IBUPROFEN 800 MG: 800 TABLET ORAL at 12:26

## 2020-03-17 RX ADMIN — ACETAMINOPHEN 1000 MG: 500 TABLET ORAL at 17:44

## 2020-03-17 RX ADMIN — IBUPROFEN 800 MG: 800 TABLET ORAL at 17:44

## 2020-03-17 RX ADMIN — ACETAMINOPHEN 1000 MG: 500 TABLET ORAL at 09:28

## 2020-03-17 RX ADMIN — ACETAMINOPHEN 1000 MG: 500 TABLET ORAL at 00:38

## 2020-03-17 RX ADMIN — IBUPROFEN 800 MG: 800 TABLET ORAL at 00:37

## 2020-03-17 RX ADMIN — IBUPROFEN 800 MG: 800 TABLET ORAL at 06:18

## 2020-03-17 NOTE — PLAN OF CARE
Problem: Patient Care Overview  Goal: Plan of Care Review  Outcome: Ongoing (interventions implemented as appropriate)  Flowsheets  Taken 3/16/2020 0425 by Jeane Peña, RN  Progress: improving  Taken 3/16/2020 1800 by Jennifer Davey RN  Plan of Care Reviewed With: patient  Outcome Summary: Ambulated to NICU 3 times today, voids and voiced very little discomfort at all and taking scheduled Tylenol and Motrin helps, VVS and no fever today, will f/u with Dr Mejia or Art

## 2020-03-17 NOTE — PLAN OF CARE
Problem: Patient Care Overview  Goal: Plan of Care Review  Outcome: Ongoing (interventions implemented as appropriate)  Flowsheets (Taken 3/17/2020 4764)  Progress: improving  Plan of Care Reviewed With: patient  Outcome Summary: VSS, pain controlled, ambulating in Hallway, voids

## 2020-03-17 NOTE — PROGRESS NOTES
[facility]  Christiana Aguirre  : 1990  MRN: [id number]  CSN: [id number]    Postpartum Day #2  Subjective   Patient has mild abdominal pain, states that she can feel slight contractions. Urinating without any difficulty. Ambulating without any issues. Has not had a bowel movement but is passing flatus.     Objective     Min/max vitals past 24 hours:   Temp  Min: 97.6 °F (36.4 °C)  Max: 98.1 °F (36.7 °C)  BP  Min: 91/50  Max: 110/54  Pulse  Min: 62  Max: 74  Pulse  Min: 62  Max: 74        Abdomen: Soft, non-tender; bowel sounds normal; no masses   fundus firm and tender   Calves: Nontender, no cords palpable   Pelvic: deferred     Lab Results   Component Value Date    WBC 19.08 (H) 03/15/2020    HGB 10.7 (L) 03/15/2020    HCT 31.0 (L) 03/15/2020    MCV 89.9 03/15/2020     03/15/2020    ABORH A Rh Positive 2016    RH Positive 03/15/2020    HEPBSAG Non-Reactive 2019        Assessment   1. Postpartum Day #2 S/P vaginal delivery which was complicated by chorioamnionitis after delivery. Has completed her antibiotic course. Tmax of 100.4 on 3/16/20. Has remained afebrile since then. Mild fundal tenderness.  Is doing well and recovering appropriately.     Plan   1. Continue routine postpartum care  2. Continue tylenol and motrin  3. Will monitor for 1 more day   4. Anticipate discharge tomorrow if has remained afebrile for 24 hours                This document has been electronically signed by Cricket Booker MD on 2020 06:52

## 2020-03-17 NOTE — PLAN OF CARE
Problem: Patient Care Overview  Goal: Plan of Care Review  Outcome: Ongoing (interventions implemented as appropriate)  Flowsheets  Taken 3/16/2020 0425 by Jeane Peña RN  Progress: improving  Taken 3/16/2020 2036 by Sybil Gutierrez RN  Plan of Care Reviewed With: patient  Taken 3/17/2020 0454 by Sybil Gutierrez RN  Outcome Summary: ambulated to nicu 5 times tonight, voids, pain controlled with tylenol and ibuprofen, vss, and bleeding light  Goal: Individualization and Mutuality  Outcome: Ongoing (interventions implemented as appropriate)  Goal: Discharge Needs Assessment  Outcome: Ongoing (interventions implemented as appropriate)  Goal: Interprofessional Rounds/Family Conf  Outcome: Ongoing (interventions implemented as appropriate)     Problem: Postpartum (Vaginal Delivery) (Adult,Obstetrics,Pediatric)  Goal: Signs and Symptoms of Listed Potential Problems Will be Absent, Minimized or Managed (Postpartum)  Outcome: Ongoing (interventions implemented as appropriate)     Problem: Breastfeeding (Adult,Obstetrics,Pediatric)  Goal: Signs and Symptoms of Listed Potential Problems Will be Absent, Minimized or Managed (Breastfeeding)  Outcome: Ongoing (interventions implemented as appropriate)

## 2020-03-18 VITALS
HEART RATE: 88 BPM | SYSTOLIC BLOOD PRESSURE: 96 MMHG | OXYGEN SATURATION: 98 % | DIASTOLIC BLOOD PRESSURE: 51 MMHG | TEMPERATURE: 98.3 F | RESPIRATION RATE: 18 BRPM

## 2020-03-18 PROCEDURE — 99024 POSTOP FOLLOW-UP VISIT: CPT | Performed by: OBSTETRICS & GYNECOLOGY

## 2020-03-18 RX ORDER — ACETAMINOPHEN 500 MG
1000 TABLET ORAL EVERY 8 HOURS
Qty: 60 TABLET | Refills: 2 | Status: SHIPPED | OUTPATIENT
Start: 2020-03-18 | End: 2021-10-06

## 2020-03-18 RX ORDER — IBUPROFEN 800 MG/1
800 TABLET ORAL EVERY 6 HOURS SCHEDULED
Qty: 60 TABLET | Refills: 2 | Status: SHIPPED | OUTPATIENT
Start: 2020-03-18 | End: 2021-10-06

## 2020-03-18 RX ADMIN — PRENATAL VIT W/ FE FUMARATE-FA TAB 27-0.8 MG 1 TABLET: 27-0.8 TAB at 08:26

## 2020-03-18 NOTE — PLAN OF CARE
Problem: Patient Care Overview  Goal: Plan of Care Review  Outcome: Ongoing (interventions implemented as appropriate)  Flowsheets  Taken 3/18/2020 0413  Progress: improving  Outcome Summary: Ambulating in the room, breast feeding, voids.  Taken 3/17/2020 4319  Plan of Care Reviewed With: patient

## 2020-03-18 NOTE — NURSING NOTE
Reviewed discharge instructions with patient.  Allowed for opportunity for questions. All questions answered. Patient verbalized understanding and is agreeable to discharge.

## 2020-03-18 NOTE — DISCHARGE SUMMARY
[facility]  Christiana Aguirre  : 1990  MRN: [id number]  CSN: [id number]    Discharge Summary:    Date of Admission: 3/15/2020  Date of Discharge:  3/18/2020    Admitting Diagnosis:  1. IUP @ 38w1d  2. in labor     Discharge Diagnosis:  1. S/P    2. chorioamnionitis        History and Hospital Course:  Patient is a   who presents in labor.  Her pregnancy was complicated by Chorioamnionitis.  Please see History and Physical for full details.       She was admitted and progressed in labor with epidural analgesia to completely dilated. She had a vaginal delivery of a  viable female   infant who weighed 2900 g (6 lb 6.3 oz)  and APGARs of        APGARS  One minute Five minutes Ten minutes Fifteen minutes Twenty minutes   Skin color: 0   1             Heart rate: 2   2             Grimace: 2   2              Muscle tone: 2   2              Breathin   2              Totals: 8   9              . No immediate complications were encountered. Please see procedure note for full details.      Her postpartum course has been unremarkable. She had no signs or symptoms of acute blood loss anemia. She was ambulating well, voiding without difficulty and lochia was within normal limits. She was breast feeding without difficulty. She was stable for discharge on postpartum day 3.      Precautions and instructions were discussed with her including but not limited to maintaining a regular diet at home, practicing local hygiene, pelvic rest, and signs and symptoms to report including heavy bleeding, frequent passage of clots, fainting or dizziness, foul odor of lochia, increasing pain, fever, or any other concerns.    She was asked to follow up in the office in 4 weeks.    Condition: Stable  Discharge Disposition: home  Discharge Diet:   Diet Instructions     Diet: Regular      Discharge Diet:  Regular        Activity at Discharge:   Activity Instructions     Bathing Restrictions      Type of Restriction:   Bathing    Bathing Restrictions:  Other    Explain Bathing Restrictions:  No soaking in bathtub for 4 weeks, showers are fine.    Driving Restrictions      Type of Restriction:  Driving    Driving Restrictions:  No Driving (Time Limited)    Length:  Other    Indicate Length of Restriction:  No driving for 1 week or while on narcotic pain medications. Riding is car is fine.    Lifting Restrictions      Type of Restriction:  Lifting    Lifting Restrictions:  Other    Explain Lifing Restrictions:  No lifting more than infant and baby carrier together for 6 weeks.    Pelvic Rest      Nothing in the vagina for 6 weeks to include tampons or intercourse.    Sexual Activity Restrictions      Type of Restriction:  Sex    Explain Sexual Activity Restrictions:  No sexual intercourse for at least 6 weeks        Discharge Medications:       Discharge Medications      New Medications      Instructions Start Date   acetaminophen 500 MG tablet  Commonly known as:  TYLENOL   1,000 mg, Oral, Every 8 Hours      ibuprofen 800 MG tablet  Commonly known as:  ADVIL,MOTRIN   800 mg, Oral, Every 6 Hours Scheduled         Continue These Medications      Instructions Start Date   docusate sodium 100 MG capsule  Commonly known as:  Colace   100 mg, Oral, 2 Times Daily      ferrous sulfate 325 (65 FE) MG tablet   325 mg, Oral, 3 Times Daily With Meals      ondansetron 4 MG tablet  Commonly known as:  Zofran   4 mg, Oral, Every 8 Hours PRN      PRENATAL ADULT GUMMY/DHA/FA PO   Oral           Patient will restart all home medications including prenatal vitamins.              This document has been electronically signed by Van Cordova DO on March 18, 2020 07:56      I have seen and evaluated the patient.  I have discussed the case with the resident. I have reviewed the notes, assessment and plan, and/or procedures performed by the resident. I concur with the resident’s documentation.        This document has been electronically signed  by Van Cordova, DO on March 18, 2020 07:56

## 2021-10-06 ENCOUNTER — OFFICE VISIT (OUTPATIENT)
Dept: FAMILY MEDICINE CLINIC | Facility: CLINIC | Age: 31
End: 2021-10-06

## 2021-10-06 VITALS
BODY MASS INDEX: 18.17 KG/M2 | DIASTOLIC BLOOD PRESSURE: 70 MMHG | SYSTOLIC BLOOD PRESSURE: 120 MMHG | OXYGEN SATURATION: 99 % | HEART RATE: 78 BPM | WEIGHT: 92.56 LBS | HEIGHT: 60 IN

## 2021-10-06 DIAGNOSIS — F41.9 ANXIETY: Primary | ICD-10-CM

## 2021-10-06 DIAGNOSIS — Z86.2 HISTORY OF ANEMIA: ICD-10-CM

## 2021-10-06 PROCEDURE — 99214 OFFICE O/P EST MOD 30 MIN: CPT | Performed by: FAMILY MEDICINE

## 2021-10-06 RX ORDER — BUSPIRONE HYDROCHLORIDE 5 MG/1
5 TABLET ORAL 3 TIMES DAILY
Qty: 90 TABLET | Refills: 2 | Status: SHIPPED | OUTPATIENT
Start: 2021-10-06 | End: 2021-12-02

## 2021-10-06 NOTE — PROGRESS NOTES
Subjective   Christiana Aguirre is a 30 y.o. female.   Cc: follow up of chronic medical issues    Anxiety  Presents for follow-up visit. Symptoms include decreased concentration, excessive worry, irritability, nervous/anxious behavior and palpitations. Patient reports no chest pain, compulsions, confusion, depressed mood, dizziness, dry mouth, feeling of choking, hyperventilation, insomnia, malaise, muscle tension, nausea, obsessions, panic, restlessness, shortness of breath or suicidal ideas.     Her past medical history is significant for anemia.   Anemia  Presents for follow-up visit. Symptoms include bruises/bleeds easily, malaise/fatigue and palpitations. There has been no abdominal pain, anorexia, confusion, fever, leg swelling, light-headedness, pallor, pica or weight loss. Signs of blood loss that are not present include hematemesis, hematochezia, melena, menorrhagia and vaginal bleeding.       The following portions of the patient's history were reviewed and updated as appropriate: allergies, current medications, past family history, past medical history, past social history, past surgical history and problem list.    Review of Systems   Constitutional: Positive for fatigue, irritability and malaise/fatigue. Negative for activity change, appetite change, chills, diaphoresis, fever, unexpected weight change and weight loss.   HENT: Negative for congestion, drooling, ear discharge, ear pain, facial swelling, hearing loss, mouth sores, nosebleeds, postnasal drip, rhinorrhea, sinus pressure, sinus pain, sneezing, sore throat, tinnitus, trouble swallowing and voice change.    Eyes: Negative for photophobia, pain, discharge, redness, itching and visual disturbance.   Respiratory: Negative for apnea, cough, choking, chest tightness, shortness of breath, wheezing and stridor.    Cardiovascular: Positive for palpitations. Negative for chest pain and leg swelling.   Gastrointestinal: Negative for abdominal  distention, abdominal pain, anal bleeding, anorexia, blood in stool, constipation, diarrhea, hematemesis, hematochezia, melena, nausea, rectal pain and vomiting.   Endocrine: Negative for cold intolerance, heat intolerance, polydipsia, polyphagia and polyuria.   Genitourinary: Negative for decreased urine volume, difficulty urinating, dyspareunia, dysuria, enuresis, flank pain, frequency, genital sores, hematuria, menorrhagia, menstrual problem, pelvic pain, urgency, vaginal bleeding, vaginal discharge and vaginal pain.   Musculoskeletal: Negative for arthralgias, back pain, gait problem, joint swelling, myalgias, neck pain and neck stiffness.   Skin: Negative for color change, pallor, rash and wound.   Allergic/Immunologic: Positive for food allergies. Negative for environmental allergies and immunocompromised state.   Neurological: Negative for dizziness, tremors, seizures, syncope, facial asymmetry, speech difficulty, weakness, light-headedness, numbness and headaches.   Hematological: Negative for adenopathy. Bruises/bleeds easily.   Psychiatric/Behavioral: Positive for decreased concentration and sleep disturbance. Negative for agitation, behavioral problems, confusion, dysphoric mood, hallucinations, self-injury and suicidal ideas. The patient is nervous/anxious. The patient does not have insomnia and is not hyperactive.        Objective   Physical Exam  Vitals reviewed.   Constitutional:       Appearance: Normal appearance.   HENT:      Head: Normocephalic and atraumatic.      Right Ear: Tympanic membrane, ear canal and external ear normal.      Left Ear: Tympanic membrane, ear canal and external ear normal.      Nose: Nose normal.      Mouth/Throat:      Mouth: Mucous membranes are moist.      Pharynx: Oropharynx is clear.      Comments: She has upper and lower plates.  Cardiovascular:      Rate and Rhythm: Normal rate and regular rhythm.      Heart sounds: Normal heart sounds. No murmur heard.   No friction  "rub. No gallop.    Pulmonary:      Effort: Pulmonary effort is normal. No respiratory distress.      Breath sounds: Normal breath sounds. No stridor. No wheezing, rhonchi or rales.   Chest:      Chest wall: No tenderness.   Abdominal:      General: Abdomen is flat. Bowel sounds are normal. There is no distension.      Palpations: Abdomen is soft. There is no mass.      Tenderness: There is no abdominal tenderness. There is no guarding or rebound.      Hernia: No hernia is present.   Musculoskeletal:      Cervical back: Normal range of motion and neck supple.      Comments: Mild tenderness on palpation of the lower back on the right.   Skin:     General: Skin is warm and dry.   Neurological:      Mental Status: She is alert.           Visit Vitals  /70   Pulse 78   Ht 152.4 cm (60\")   Wt 42 kg (92 lb 9 oz)   LMP 09/06/2021 (Within Days)   SpO2 99%   Breastfeeding No   BMI 18.08 kg/m²     Body mass index is 18.08 kg/m².      Assessment/Plan   Diagnoses and all orders for this visit:    1. Anxiety (Primary)  -     Comprehensive metabolic panel; Future  -     CBC w AUTO Differential; Future    2. History of anemia  -     Comprehensive metabolic panel; Future  -     CBC w AUTO Differential; Future    Other orders  -     busPIRone (BUSPAR) 5 MG tablet; Take 1 tablet by mouth 3 (Three) Times a Day.  Dispense: 90 tablet; Refill: 2    Return to the clinic in 3 month/s.  Will contact with results as needed.    "

## 2021-12-02 ENCOUNTER — OFFICE VISIT (OUTPATIENT)
Dept: FAMILY MEDICINE CLINIC | Facility: CLINIC | Age: 31
End: 2021-12-02

## 2021-12-02 ENCOUNTER — LAB (OUTPATIENT)
Dept: LAB | Facility: HOSPITAL | Age: 31
End: 2021-12-02

## 2021-12-02 VITALS
SYSTOLIC BLOOD PRESSURE: 118 MMHG | DIASTOLIC BLOOD PRESSURE: 62 MMHG | WEIGHT: 96.38 LBS | BODY MASS INDEX: 18.92 KG/M2 | OXYGEN SATURATION: 98 % | HEART RATE: 113 BPM | HEIGHT: 60 IN

## 2021-12-02 DIAGNOSIS — D64.9 ANEMIA, UNSPECIFIED TYPE: ICD-10-CM

## 2021-12-02 DIAGNOSIS — Z86.2 HISTORY OF ANEMIA: ICD-10-CM

## 2021-12-02 DIAGNOSIS — F41.9 ANXIETY: ICD-10-CM

## 2021-12-02 DIAGNOSIS — F41.9 ANXIETY: Primary | Chronic | ICD-10-CM

## 2021-12-02 PROCEDURE — 36415 COLL VENOUS BLD VENIPUNCTURE: CPT

## 2021-12-02 PROCEDURE — 80053 COMPREHEN METABOLIC PANEL: CPT

## 2021-12-02 PROCEDURE — 99214 OFFICE O/P EST MOD 30 MIN: CPT | Performed by: FAMILY MEDICINE

## 2021-12-02 PROCEDURE — 85025 COMPLETE CBC W/AUTO DIFF WBC: CPT

## 2021-12-02 RX ORDER — BUSPIRONE HYDROCHLORIDE 5 MG/1
10 TABLET ORAL 3 TIMES DAILY
Qty: 180 TABLET | Refills: 2 | Status: SHIPPED | OUTPATIENT
Start: 2021-12-02 | End: 2022-01-05 | Stop reason: DRUGHIGH

## 2021-12-02 NOTE — PROGRESS NOTES
Subjective   Christiana Aguirre is a 30 y.o. female.   Cc: follow up of chronic medical issues    Anxiety  Presents for follow-up visit. Symptoms include decreased concentration, depressed mood, excessive worry, insomnia, irritability, nervous/anxious behavior, panic and restlessness. Patient reports no chest pain, compulsions, confusion, dizziness, dry mouth, feeling of choking, hyperventilation, malaise, muscle tension, nausea, obsessions, palpitations, shortness of breath or suicidal ideas.     Her past medical history is significant for anemia.   Anemia  Presents for follow-up visit. Symptoms include malaise/fatigue. There has been no abdominal pain, anorexia, bruising/bleeding easily, confusion, fever, leg swelling, light-headedness, palpitations, paresthesias or pica.       The following portions of the patient's history were reviewed and updated as appropriate: allergies, current medications, past family history, past medical history, past social history, past surgical history and problem list.    Review of Systems   Constitutional: Positive for irritability and malaise/fatigue. Negative for fever.   Respiratory: Negative for shortness of breath.    Cardiovascular: Negative for chest pain and palpitations.   Gastrointestinal: Negative for abdominal pain, anorexia and nausea.   Neurological: Negative for dizziness, light-headedness and paresthesias.   Hematological: Does not bruise/bleed easily.   Psychiatric/Behavioral: Positive for decreased concentration. Negative for confusion and suicidal ideas. The patient is nervous/anxious and has insomnia.        Objective   Physical Exam  Vitals reviewed.   Constitutional:       Appearance: Normal appearance.   HENT:      Head: Normocephalic and atraumatic.      Right Ear: Tympanic membrane, ear canal and external ear normal.      Left Ear: Tympanic membrane, ear canal and external ear normal.      Nose: Nose normal.      Mouth/Throat:      Mouth: Mucous membranes  "are moist.      Pharynx: Oropharynx is clear.   Cardiovascular:      Rate and Rhythm: Normal rate and regular rhythm.      Heart sounds: Normal heart sounds. No murmur heard.  No friction rub. No gallop.    Pulmonary:      Effort: Pulmonary effort is normal. No respiratory distress.      Breath sounds: Normal breath sounds. No stridor. No wheezing, rhonchi or rales.   Chest:      Chest wall: No tenderness.   Abdominal:      General: Abdomen is flat. Bowel sounds are normal. There is no distension.      Palpations: Abdomen is soft. There is no mass.      Tenderness: There is no abdominal tenderness. There is no guarding or rebound.      Hernia: No hernia is present.   Musculoskeletal:      Cervical back: Normal range of motion.   Skin:     General: Skin is warm and dry.   Neurological:      Mental Status: She is alert.           Visit Vitals  /62   Pulse 113   Ht 152.4 cm (60\")   Wt 43.7 kg (96 lb 6 oz)   LMP 11/21/2021 (Within Days)   SpO2 98%   Breastfeeding No   BMI 18.82 kg/m²     Body mass index is 18.82 kg/m².      Assessment/Plan   Diagnoses and all orders for this visit:    1. Anxiety (Primary)  -     Comprehensive metabolic panel; Future    2. Anemia, unspecified type  -     CBC w AUTO Differential; Future    Other orders  -     busPIRone (BUSPAR) 5 MG tablet; Take 2 tablets by mouth 3 (Three) Times a Day.  Dispense: 180 tablet; Refill: 2    I reviewed the CBC and BMP with the patient.  Have increased the blood work.  Return to the clinic in 2 month/s.  Will contact with results as needed.    "

## 2021-12-03 LAB
ALBUMIN SERPL-MCNC: 4.7 G/DL (ref 3.5–5.2)
ALBUMIN/GLOB SERPL: 1.7 G/DL
ALP SERPL-CCNC: 44 U/L (ref 39–117)
ALT SERPL W P-5'-P-CCNC: 8 U/L (ref 1–33)
ANION GAP SERPL CALCULATED.3IONS-SCNC: 10.1 MMOL/L (ref 5–15)
AST SERPL-CCNC: 15 U/L (ref 1–32)
BASOPHILS # BLD AUTO: 0.08 10*3/MM3 (ref 0–0.2)
BASOPHILS NFR BLD AUTO: 1.2 % (ref 0–1.5)
BILIRUB SERPL-MCNC: 0.3 MG/DL (ref 0–1.2)
BUN SERPL-MCNC: 7 MG/DL (ref 6–20)
BUN/CREAT SERPL: 10.6 (ref 7–25)
CALCIUM SPEC-SCNC: 9.3 MG/DL (ref 8.6–10.5)
CHLORIDE SERPL-SCNC: 103 MMOL/L (ref 98–107)
CO2 SERPL-SCNC: 24.9 MMOL/L (ref 22–29)
CREAT SERPL-MCNC: 0.66 MG/DL (ref 0.57–1)
DEPRECATED RDW RBC AUTO: 39.7 FL (ref 37–54)
EOSINOPHIL # BLD AUTO: 0.07 10*3/MM3 (ref 0–0.4)
EOSINOPHIL NFR BLD AUTO: 1.1 % (ref 0.3–6.2)
ERYTHROCYTE [DISTWIDTH] IN BLOOD BY AUTOMATED COUNT: 12.2 % (ref 12.3–15.4)
GFR SERPL CREATININE-BSD FRML MDRD: 106 ML/MIN/1.73
GLOBULIN UR ELPH-MCNC: 2.7 GM/DL
GLUCOSE SERPL-MCNC: 79 MG/DL (ref 65–99)
HCT VFR BLD AUTO: 37.5 % (ref 34–46.6)
HGB BLD-MCNC: 12.8 G/DL (ref 12–15.9)
IMM GRANULOCYTES # BLD AUTO: 0.01 10*3/MM3 (ref 0–0.05)
IMM GRANULOCYTES NFR BLD AUTO: 0.2 % (ref 0–0.5)
LYMPHOCYTES # BLD AUTO: 2.15 10*3/MM3 (ref 0.7–3.1)
LYMPHOCYTES NFR BLD AUTO: 33.4 % (ref 19.6–45.3)
MCH RBC QN AUTO: 30.4 PG (ref 26.6–33)
MCHC RBC AUTO-ENTMCNC: 34.1 G/DL (ref 31.5–35.7)
MCV RBC AUTO: 89.1 FL (ref 79–97)
MONOCYTES # BLD AUTO: 0.47 10*3/MM3 (ref 0.1–0.9)
MONOCYTES NFR BLD AUTO: 7.3 % (ref 5–12)
NEUTROPHILS NFR BLD AUTO: 3.66 10*3/MM3 (ref 1.7–7)
NEUTROPHILS NFR BLD AUTO: 56.8 % (ref 42.7–76)
NRBC BLD AUTO-RTO: 0 /100 WBC (ref 0–0.2)
PLATELET # BLD AUTO: 154 10*3/MM3 (ref 140–450)
PMV BLD AUTO: 11.4 FL (ref 6–12)
POTASSIUM SERPL-SCNC: 4 MMOL/L (ref 3.5–5.2)
PROT SERPL-MCNC: 7.4 G/DL (ref 6–8.5)
RBC # BLD AUTO: 4.21 10*6/MM3 (ref 3.77–5.28)
SODIUM SERPL-SCNC: 138 MMOL/L (ref 136–145)
WBC NRBC COR # BLD: 6.44 10*3/MM3 (ref 3.4–10.8)

## 2021-12-28 ENCOUNTER — LAB (OUTPATIENT)
Dept: LAB | Facility: HOSPITAL | Age: 31
End: 2021-12-28

## 2021-12-28 ENCOUNTER — OFFICE VISIT (OUTPATIENT)
Dept: OBSTETRICS AND GYNECOLOGY | Facility: CLINIC | Age: 31
End: 2021-12-28

## 2021-12-28 VITALS
SYSTOLIC BLOOD PRESSURE: 95 MMHG | BODY MASS INDEX: 18.65 KG/M2 | WEIGHT: 95 LBS | DIASTOLIC BLOOD PRESSURE: 60 MMHG | HEIGHT: 60 IN

## 2021-12-28 DIAGNOSIS — Z12.4 CERVICAL CANCER SCREENING: ICD-10-CM

## 2021-12-28 DIAGNOSIS — N92.3 INTERMENSTRUAL BLEEDING: Primary | ICD-10-CM

## 2021-12-28 DIAGNOSIS — Z11.3 SCREEN FOR STD (SEXUALLY TRANSMITTED DISEASE): ICD-10-CM

## 2021-12-28 PROBLEM — Z64.1 MULTIGRAVIDA: Status: RESOLVED | Noted: 2019-09-24 | Resolved: 2021-12-28

## 2021-12-28 LAB
B-HCG UR QL: NEGATIVE
EXPIRATION DATE: NORMAL
INTERNAL NEGATIVE CONTROL: NEGATIVE
INTERNAL POSITIVE CONTROL: POSITIVE
Lab: NORMAL

## 2021-12-28 PROCEDURE — 87661 TRICHOMONAS VAGINALIS AMPLIF: CPT | Performed by: NURSE PRACTITIONER

## 2021-12-28 PROCEDURE — 81025 URINE PREGNANCY TEST: CPT | Performed by: NURSE PRACTITIONER

## 2021-12-28 PROCEDURE — 87591 N.GONORRHOEAE DNA AMP PROB: CPT | Performed by: NURSE PRACTITIONER

## 2021-12-28 PROCEDURE — 99213 OFFICE O/P EST LOW 20 MIN: CPT | Performed by: NURSE PRACTITIONER

## 2021-12-28 PROCEDURE — 87491 CHLMYD TRACH DNA AMP PROBE: CPT | Performed by: NURSE PRACTITIONER

## 2021-12-29 LAB
C TRACH RRNA CVX QL NAA+PROBE: NEGATIVE
N GONORRHOEA RRNA SPEC QL NAA+PROBE: NEGATIVE
TRICHOMONAS VAGINALIS PCR: NEGATIVE

## 2022-01-05 ENCOUNTER — OFFICE VISIT (OUTPATIENT)
Dept: FAMILY MEDICINE CLINIC | Facility: CLINIC | Age: 32
End: 2022-01-05

## 2022-01-05 VITALS
SYSTOLIC BLOOD PRESSURE: 110 MMHG | OXYGEN SATURATION: 99 % | DIASTOLIC BLOOD PRESSURE: 64 MMHG | BODY MASS INDEX: 18.92 KG/M2 | WEIGHT: 96.38 LBS | HEIGHT: 60 IN | HEART RATE: 81 BPM

## 2022-01-05 DIAGNOSIS — R25.1 TREMOR: ICD-10-CM

## 2022-01-05 DIAGNOSIS — F41.9 ANXIETY: Primary | Chronic | ICD-10-CM

## 2022-01-05 LAB
LAB AP CASE REPORT: NORMAL
PATH INTERP SPEC-IMP: NORMAL

## 2022-01-05 PROCEDURE — 99214 OFFICE O/P EST MOD 30 MIN: CPT | Performed by: FAMILY MEDICINE

## 2022-01-05 RX ORDER — BUSPIRONE HYDROCHLORIDE 30 MG/1
15 TABLET ORAL 3 TIMES DAILY
Qty: 45 TABLET | Refills: 3 | Status: SHIPPED | OUTPATIENT
Start: 2022-01-05 | End: 2022-03-08

## 2022-01-05 NOTE — PROGRESS NOTES
Subjective   Christiana Aguirre is a 31 y.o. female. \  Cc: follow up of chronic medical issues    Anxiety  Presents for follow-up visit. Symptoms include decreased concentration, excessive worry, insomnia, irritability, nausea and nervous/anxious behavior. Patient reports no chest pain, compulsions, confusion, depressed mood, dizziness, dry mouth, feeling of choking, malaise, muscle tension, obsessions, palpitations, panic, restlessness, shortness of breath or suicidal ideas.       She has notice that she has had a tremor. It usually occurs with stress. The Buspar has helped. She would like to increase the medication doseage if possible.  The following portions of the patient's history were reviewed and updated as appropriate: allergies, current medications, past family history, past medical history, past social history, past surgical history and problem list.    Review of Systems   Constitutional: Positive for irritability.   Respiratory: Negative for shortness of breath.    Cardiovascular: Negative for chest pain and palpitations.   Gastrointestinal: Positive for nausea.   Neurological: Negative for dizziness.   Psychiatric/Behavioral: Positive for decreased concentration. Negative for confusion and suicidal ideas. The patient is nervous/anxious and has insomnia.        Objective   Physical Exam  Vitals reviewed.   Constitutional:       Appearance: Normal appearance.   HENT:      Head: Normocephalic and atraumatic.      Right Ear: Tympanic membrane, ear canal and external ear normal.      Left Ear: Tympanic membrane, ear canal and external ear normal.      Nose: Nose normal.      Mouth/Throat:      Mouth: Mucous membranes are moist.      Pharynx: Oropharynx is clear.      Comments: She has upper and lower plates.  Cardiovascular:      Rate and Rhythm: Normal rate and regular rhythm.      Heart sounds: Normal heart sounds. No murmur heard.  No friction rub. No gallop.    Pulmonary:      Effort: Pulmonary effort  "is normal. No respiratory distress.      Breath sounds: Normal breath sounds. No stridor. No wheezing, rhonchi or rales.   Chest:      Chest wall: No tenderness.   Abdominal:      General: Abdomen is flat. Bowel sounds are normal. There is no distension.      Palpations: Abdomen is soft. There is no mass.      Tenderness: There is no abdominal tenderness. There is no guarding or rebound.      Hernia: No hernia is present.   Musculoskeletal:      Cervical back: Normal range of motion.   Skin:     General: Skin is warm and dry.   Neurological:      Mental Status: She is alert.           Visit Vitals  /64   Pulse 81   Ht 152.4 cm (60\")   Wt 43.7 kg (96 lb 6 oz)   LMP 12/22/2021 (Exact Date)   SpO2 99%   Breastfeeding No   BMI 18.82 kg/m²     Body mass index is 18.82 kg/m².      Assessment/Plan   Diagnoses and all orders for this visit:    1. Anxiety (Primary)    2. Tremor    Other orders  -     busPIRone (BUSPAR) 30 MG tablet; Take 0.5 tablets by mouth 3 (Three) Times a Day.  Dispense: 45 tablet; Refill: 3    I reviewed the CBC and CMP with the patient.  Return to the clinic in 2 month/s.  Will contact with results as needed.    "

## 2022-03-08 ENCOUNTER — OFFICE VISIT (OUTPATIENT)
Dept: FAMILY MEDICINE CLINIC | Facility: CLINIC | Age: 32
End: 2022-03-08

## 2022-03-08 VITALS
SYSTOLIC BLOOD PRESSURE: 98 MMHG | OXYGEN SATURATION: 98 % | HEIGHT: 60 IN | WEIGHT: 98.8 LBS | DIASTOLIC BLOOD PRESSURE: 56 MMHG | HEART RATE: 94 BPM | BODY MASS INDEX: 19.39 KG/M2

## 2022-03-08 DIAGNOSIS — F41.9 ANXIETY: Primary | Chronic | ICD-10-CM

## 2022-03-08 DIAGNOSIS — D64.89 ANEMIA DUE TO OTHER CAUSE, NOT CLASSIFIED: ICD-10-CM

## 2022-03-08 PROCEDURE — 99214 OFFICE O/P EST MOD 30 MIN: CPT | Performed by: FAMILY MEDICINE

## 2022-03-08 RX ORDER — HYDROXYZINE PAMOATE 25 MG/1
25 CAPSULE ORAL 3 TIMES DAILY PRN
Qty: 90 CAPSULE | Refills: 2 | Status: SHIPPED | OUTPATIENT
Start: 2022-03-08

## 2022-03-08 NOTE — PROGRESS NOTES
Subjective   Christiana Aguirre is a 31 y.o. female.   Cc: follow up of chronic medical issues  She is finding the Buspirone is causing dizziness and nauseated.  Anxiety  Presents for follow-up visit. Symptoms include decreased concentration, depressed mood, excessive worry, insomnia, nausea, nervous/anxious behavior, palpitations, panic and restlessness. Patient reports no chest pain, compulsions, confusion, dizziness, dry mouth, feeling of choking, hyperventilation, irritability, malaise, muscle tension, obsessions, shortness of breath or suicidal ideas.     Her past medical history is significant for anemia.   Anemia  Presents for follow-up visit. Symptoms include light-headedness, malaise/fatigue, palpitations and weight loss. There has been no abdominal pain, anorexia, bruising/bleeding easily, confusion, fever, leg swelling, pallor, paresthesias or pica.       The following portions of the patient's history were reviewed and updated as appropriate: allergies, current medications, past family history, past medical history, past social history, past surgical history and problem list.    Review of Systems   Constitutional: Positive for malaise/fatigue and weight loss. Negative for fever and irritability.   Respiratory: Negative for shortness of breath.    Cardiovascular: Positive for palpitations. Negative for chest pain.   Gastrointestinal: Positive for nausea. Negative for abdominal pain and anorexia.   Skin: Negative for pallor.   Neurological: Positive for light-headedness. Negative for dizziness and paresthesias.   Hematological: Does not bruise/bleed easily.   Psychiatric/Behavioral: Positive for decreased concentration. Negative for confusion and suicidal ideas. The patient is nervous/anxious and has insomnia.        Objective   Physical Exam  Vitals reviewed.   Constitutional:       Appearance: Normal appearance.   HENT:      Head: Normocephalic and atraumatic.      Right Ear: Tympanic membrane, ear  "canal and external ear normal.      Left Ear: Tympanic membrane, ear canal and external ear normal.      Nose: Nose normal.      Mouth/Throat:      Mouth: Mucous membranes are moist.      Pharynx: Oropharynx is clear.   Cardiovascular:      Rate and Rhythm: Normal rate and regular rhythm.      Heart sounds: Normal heart sounds. No murmur heard.    No friction rub. No gallop.   Pulmonary:      Effort: Pulmonary effort is normal. No respiratory distress.      Breath sounds: Normal breath sounds. No stridor. No wheezing, rhonchi or rales.   Chest:      Chest wall: No tenderness.   Abdominal:      General: Abdomen is flat. Bowel sounds are normal. There is no distension.      Palpations: Abdomen is soft. There is no mass.      Tenderness: There is no abdominal tenderness. There is no guarding or rebound.      Hernia: No hernia is present.   Musculoskeletal:      Cervical back: Normal range of motion.   Skin:     General: Skin is warm and dry.   Neurological:      Mental Status: She is alert.           Visit Vitals  BP 98/56   Pulse 94   Ht 152.4 cm (60\")   Wt 44.8 kg (98 lb 12.8 oz)   SpO2 98%   BMI 19.30 kg/m²     Body mass index is 19.3 kg/m².      Assessment/Plan   Diagnoses and all orders for this visit:    1. Anxiety (Primary)  -     Comprehensive metabolic panel; Future    2. Anemia due to other cause, not classified  -     CBC w AUTO Differential; Future    Other orders  -     hydrOXYzine pamoate (Vistaril) 25 MG capsule; Take 1 capsule by mouth 3 (Three) Times a Day As Needed for Itching or Anxiety.  Dispense: 90 capsule; Refill: 2    I reviewed the CBC and CMP with the patient..  Return to the clinic in 3 month/s.  Will contact with results as needed.    "

## 2022-03-22 ENCOUNTER — LAB (OUTPATIENT)
Dept: LAB | Facility: HOSPITAL | Age: 32
End: 2022-03-22

## 2022-03-22 ENCOUNTER — OFFICE VISIT (OUTPATIENT)
Dept: OBSTETRICS AND GYNECOLOGY | Facility: CLINIC | Age: 32
End: 2022-03-22

## 2022-03-22 VITALS
SYSTOLIC BLOOD PRESSURE: 106 MMHG | DIASTOLIC BLOOD PRESSURE: 60 MMHG | WEIGHT: 97 LBS | BODY MASS INDEX: 19.04 KG/M2 | HEIGHT: 60 IN

## 2022-03-22 DIAGNOSIS — N93.9 ABNORMAL UTERINE BLEEDING (AUB): ICD-10-CM

## 2022-03-22 DIAGNOSIS — R10.2 PELVIC PAIN: ICD-10-CM

## 2022-03-22 DIAGNOSIS — N93.9 ABNORMAL UTERINE BLEEDING (AUB): Primary | ICD-10-CM

## 2022-03-22 PROCEDURE — 36415 COLL VENOUS BLD VENIPUNCTURE: CPT | Performed by: NURSE PRACTITIONER

## 2022-03-22 PROCEDURE — 80053 COMPREHEN METABOLIC PANEL: CPT | Performed by: NURSE PRACTITIONER

## 2022-03-22 PROCEDURE — 84443 ASSAY THYROID STIM HORMONE: CPT | Performed by: NURSE PRACTITIONER

## 2022-03-22 PROCEDURE — 99212 OFFICE O/P EST SF 10 MIN: CPT | Performed by: NURSE PRACTITIONER

## 2022-03-22 PROCEDURE — 85027 COMPLETE CBC AUTOMATED: CPT

## 2022-03-23 LAB
ALBUMIN SERPL-MCNC: 4.8 G/DL (ref 3.5–5.2)
ALBUMIN/GLOB SERPL: 1.9 G/DL
ALP SERPL-CCNC: 50 U/L (ref 39–117)
ALT SERPL W P-5'-P-CCNC: 6 U/L (ref 1–33)
ANION GAP SERPL CALCULATED.3IONS-SCNC: 9.3 MMOL/L (ref 5–15)
AST SERPL-CCNC: 12 U/L (ref 1–32)
BILIRUB SERPL-MCNC: <0.2 MG/DL (ref 0–1.2)
BUN SERPL-MCNC: 8 MG/DL (ref 6–20)
BUN/CREAT SERPL: 11.1 (ref 7–25)
CALCIUM SPEC-SCNC: 9.4 MG/DL (ref 8.6–10.5)
CHLORIDE SERPL-SCNC: 103 MMOL/L (ref 98–107)
CO2 SERPL-SCNC: 26.7 MMOL/L (ref 22–29)
CREAT SERPL-MCNC: 0.72 MG/DL (ref 0.57–1)
DEPRECATED RDW RBC AUTO: 41.2 FL (ref 37–54)
EGFRCR SERPLBLD CKD-EPI 2021: 115.5 ML/MIN/1.73
ERYTHROCYTE [DISTWIDTH] IN BLOOD BY AUTOMATED COUNT: 12.5 % (ref 12.3–15.4)
GLOBULIN UR ELPH-MCNC: 2.5 GM/DL
GLUCOSE SERPL-MCNC: 99 MG/DL (ref 65–99)
HCT VFR BLD AUTO: 38.7 % (ref 34–46.6)
HGB BLD-MCNC: 12.8 G/DL (ref 12–15.9)
MCH RBC QN AUTO: 29.9 PG (ref 26.6–33)
MCHC RBC AUTO-ENTMCNC: 33.1 G/DL (ref 31.5–35.7)
MCV RBC AUTO: 90.4 FL (ref 79–97)
PLATELET # BLD AUTO: 182 10*3/MM3 (ref 140–450)
PMV BLD AUTO: 11.9 FL (ref 6–12)
POTASSIUM SERPL-SCNC: 3.9 MMOL/L (ref 3.5–5.2)
PROT SERPL-MCNC: 7.3 G/DL (ref 6–8.5)
RBC # BLD AUTO: 4.28 10*6/MM3 (ref 3.77–5.28)
SODIUM SERPL-SCNC: 139 MMOL/L (ref 136–145)
TSH SERPL DL<=0.05 MIU/L-ACNC: 1.73 UIU/ML (ref 0.27–4.2)
WBC NRBC COR # BLD: 5.83 10*3/MM3 (ref 3.4–10.8)

## 2022-03-24 NOTE — PROGRESS NOTES
Subjective   Christiana Aguirre is a 31 y.o. frequent vaginal bleeding    LMP: 03/17/2022  Pap: NIL, negative hrHPV 01/2022  BC: partner vasectomy     Pt presents with continued frequent vaginal bleeding.  She reports bleeding nearly every other week.  Her vaginal bleeding is heavy at times then turns to moderate flow.  In addition to bleeding she is experiencing painful menstrual cramping and low back pain. She reports positive family history of hypothyroid disease.  Her mother had to have hysterectomy due to frequent vaginal bleeding.       Vaginal Bleeding  The patient's primary symptoms include pelvic pain and vaginal bleeding. The patient's pertinent negatives include no genital itching, genital lesions, genital odor, genital rash or vaginal discharge. This is a recurrent problem. The current episode started more than 1 month ago. The problem occurs every several days. The problem has been waxing and waning. The pain is moderate. The problem affects both sides. She is not pregnant. Pertinent negatives include no abdominal pain, chills, constipation, diarrhea, dysuria, fever, flank pain, frequency, headaches, hematuria, rash or urgency. The vaginal discharge was bloody. The vaginal bleeding is heavier than menses. Nothing aggravates the symptoms.       The following portions of the patient's history were reviewed and updated as appropriate: allergies, current medications, past family history, past medical history, past social history, past surgical history and problem list.    Review of Systems   Constitutional: Negative for chills, diaphoresis, fatigue, fever and unexpected weight change.   Respiratory: Negative for apnea, chest tightness and shortness of breath.    Cardiovascular: Negative for chest pain and palpitations.   Gastrointestinal: Negative for abdominal distention, abdominal pain, constipation and diarrhea.   Genitourinary: Positive for menstrual problem, pelvic pain and vaginal bleeding.  Negative for decreased urine volume, difficulty urinating, dysuria, enuresis, flank pain, frequency, genital sores, hematuria, urgency, vaginal discharge and vaginal pain.   Skin: Negative for rash.   Neurological: Negative for headaches.   Psychiatric/Behavioral: Negative for sleep disturbance and suicidal ideas.         Objective   Physical Exam  Vitals and nursing note reviewed.   Constitutional:       General: She is awake. She is not in acute distress.     Appearance: Normal appearance. She is well-developed and well-groomed. She is not ill-appearing, toxic-appearing or diaphoretic.   Cardiovascular:      Rate and Rhythm: Normal rate and regular rhythm.      Heart sounds: Normal heart sounds.   Pulmonary:      Effort: Pulmonary effort is normal.      Breath sounds: Normal breath sounds.   Abdominal:      General: Abdomen is flat. Bowel sounds are normal.      Palpations: Abdomen is soft.   Skin:     General: Skin is warm and dry.   Neurological:      Mental Status: She is alert and oriented to person, place, and time.   Psychiatric:         Attention and Perception: Attention and perception normal.         Mood and Affect: Mood and affect normal.         Speech: Speech normal.         Behavior: Behavior normal. Behavior is cooperative.           Assessment/Plan   Diagnoses and all orders for this visit:    1. Abnormal uterine bleeding (AUB) (Primary)  -     US Non-ob Transvaginal; Future  -     CBC (No Diff); Future  -     Comprehensive Metabolic Panel  -     TSH    2. Pelvic pain  -     US Non-ob Transvaginal; Future  -     CBC (No Diff); Future  -     Comprehensive Metabolic Panel  -     TSH    Labs today.  RTC for TVUS with appointment afterwards.

## 2022-03-31 ENCOUNTER — OFFICE VISIT (OUTPATIENT)
Dept: OBSTETRICS AND GYNECOLOGY | Facility: CLINIC | Age: 32
End: 2022-03-31

## 2022-03-31 VITALS
DIASTOLIC BLOOD PRESSURE: 60 MMHG | SYSTOLIC BLOOD PRESSURE: 100 MMHG | WEIGHT: 96 LBS | BODY MASS INDEX: 18.85 KG/M2 | HEIGHT: 60 IN

## 2022-03-31 DIAGNOSIS — N93.9 ABNORMAL UTERINE BLEEDING (AUB): Primary | ICD-10-CM

## 2022-03-31 PROCEDURE — 58100 BIOPSY OF UTERUS LINING: CPT | Performed by: NURSE PRACTITIONER

## 2022-03-31 PROCEDURE — 88305 TISSUE EXAM BY PATHOLOGIST: CPT

## 2022-04-04 LAB
LAB AP CASE REPORT: NORMAL
LAB AP CLINICAL INFORMATION: NORMAL
PATH REPORT.FINAL DX SPEC: NORMAL

## 2022-04-05 ENCOUNTER — OFFICE VISIT (OUTPATIENT)
Dept: OBSTETRICS AND GYNECOLOGY | Facility: CLINIC | Age: 32
End: 2022-04-05

## 2022-04-05 VITALS
DIASTOLIC BLOOD PRESSURE: 58 MMHG | BODY MASS INDEX: 19.24 KG/M2 | SYSTOLIC BLOOD PRESSURE: 102 MMHG | WEIGHT: 98 LBS | HEIGHT: 60 IN

## 2022-04-05 DIAGNOSIS — N93.9 ABNORMAL UTERINE BLEEDING (AUB): Primary | ICD-10-CM

## 2022-04-05 DIAGNOSIS — N92.3 INTERMENSTRUAL BLEEDING: ICD-10-CM

## 2022-04-05 DIAGNOSIS — R10.2 PELVIC PAIN: ICD-10-CM

## 2022-04-05 PROCEDURE — 99214 OFFICE O/P EST MOD 30 MIN: CPT | Performed by: OBSTETRICS & GYNECOLOGY

## 2022-04-05 RX ORDER — NORGESTIMATE AND ETHINYL ESTRADIOL 0.25-0.035
1 KIT ORAL DAILY
Qty: 30 TABLET | Refills: 11 | Status: SHIPPED | OUTPATIENT
Start: 2022-04-05 | End: 2022-05-16

## 2022-04-05 NOTE — PROGRESS NOTES
Reviewed prelim TVUS with pt- uterus 8.22x 4.27x 5.99 cm with total volume 110.18cc, endometrium 8.1 mm, right ovary 19.43 cc with 1.54x 1.73x 1.78 cm cyst, left ovary 5.46cm      Endometrial Biopsy    Date of procedure:  4/5/2022    Procedure documentation:    The cervix was grasped anteriorly at the 11 and 1 o'clock position.  The cavity sounded to 8 centimeters.  An endometrial biopsy specimen was obtained. The tissue was sent for permanent histopathologic evaluation.  Tenaculum was removed from the cervix with scant bleeding. She tolerated the procedure well.    Post procedure instructions: She was instructed to call us in 1 week's time if she has not heard from us otherwise.  If there is any significant fever or excessive bleeding or pain she is to call immediately.      Pt desires hysterectomy consultation.     This note was electronically signed.    AC Mcgowan  April 5, 2022

## 2022-04-06 NOTE — PROGRESS NOTES
Christiana Aguirre is a 31 y.o. y/o female.     Chief Complaint: Heavy bleeding    HPI:   31 y.o. .  Patient's last menstrual period was 2022 (exact date)..  Patient presents for evaluation secondary to heavy bleeding.  Over the last several months her periods have become very heavy with continuous bleeding.  She will bleed for 2 weeks and then only have a small break and then start bleeding again.  She states that her mom went through something very similar and she is concerned that this is again a be continued to become worse.  Patient has had a pelvic ultrasound, I have reviewed the report myself.  This was overall normal other than a heterogenous appearance which could represent possible adenomyosis versus multiple small fibroids.  Patient also had endometrial biopsy which was negative.  Patient is up-to-date on Pap smear.  Discussed treatment options with patient and recommended trial of birth control pills to see if this will help with the bleeding first.  Did also discuss endometrial ablation versus hysterectomy.  Patient would like to try birth control pills however if this is not working would like to move towards surgical management.  If no improvement on oral contraception will discuss hysterectomy versus ablation.     Review of Systems   Constitutional: Negative for chills, fatigue and fever.   HENT: Negative for sore throat.    Eyes: Negative for visual disturbance.   Respiratory: Negative for cough, shortness of breath and wheezing.    Cardiovascular: Negative for chest pain, palpitations and leg swelling.   Gastrointestinal: Negative for abdominal pain, diarrhea, nausea and vomiting.   Endocrine: Negative for cold intolerance and heat intolerance.   Genitourinary: Positive for menstrual problem, pelvic pain and vaginal bleeding. Negative for dysuria, flank pain, frequency, vaginal discharge and vaginal pain.   Skin: Negative for color change and pallor.   Neurological: Negative for  syncope, light-headedness and headaches.   Psychiatric/Behavioral: Negative for dysphoric mood and suicidal ideas. The patient is not nervous/anxious.         The following portions of the patient's history were reviewed and updated as appropriate: allergies, current medications, past family history, past medical history, past social history, past surgical history and problem list.    No Known Allergies     Prior to Admission medications    Medication Sig Start Date End Date Taking? Authorizing Provider   hydrOXYzine pamoate (Vistaril) 25 MG capsule Take 1 capsule by mouth 3 (Three) Times a Day As Needed for Itching or Anxiety. 3/8/22  Yes Jonatan Layton MD   norgestimate-ethinyl estradiol (Sprintec 28) 0.25-35 MG-MCG per tablet Take 1 tablet by mouth Daily for 30 days. 22  Van Cordova, DO        The patient has a family history of   Family History   Problem Relation Age of Onset   • Diverticulitis Mother    • DEBBY disease Mother    • Anxiety disorder Mother    • Arthritis Mother    • Depression Mother    • Thyroid disease Mother    • COPD Father    • Heart disease Father         Past Medical History:   Diagnosis Date   • Anemia 2016    IRON INFUSION AFTER DELIVERY   • Anxiety    • Anxiety 2017   • Right sciatic nerve pain    • Varicella         OB History        4    Para   3    Term   3            AB   1    Living   3       SAB        IAB        Ectopic        Molar        Multiple   0    Live Births   3                 Social History     Socioeconomic History   • Marital status:    Tobacco Use   • Smoking status: Current Every Day Smoker     Packs/day: 0.50     Types: Cigarettes   • Smokeless tobacco: Never Used   Vaping Use   • Vaping Use: Never used   Substance and Sexual Activity   • Alcohol use: No   • Drug use: No   • Sexual activity: Yes     Partners: Male     Birth control/protection: Surgical        Past Surgical History:   Procedure Laterality  "Date   • TEETH EXTRACTION  2017        Patient Active Problem List   Diagnosis   • Tobacco use   • Right sided sciatica   •  (normal spontaneous vaginal delivery)   • Chorioamnionitis in third trimester   • Anxiety        Documented Vitals    22 1533   BP: 102/58   Weight: 44.5 kg (98 lb)   Height: 152.4 cm (60\")   PainSc: 0-No pain        Body mass index is 19.14 kg/m².    Physical Exam  Vitals and nursing note reviewed.   Constitutional:       General: She is not in acute distress.     Appearance: Normal appearance. She is well-developed. She is not ill-appearing, toxic-appearing or diaphoretic.   HENT:      Head: Normocephalic.      Mouth/Throat:      Mouth: Mucous membranes are moist.   Neck:      Thyroid: No thyromegaly.   Genitourinary:     Vagina: Normal.   Musculoskeletal:         General: No swelling, tenderness or deformity. Normal range of motion.      Cervical back: Normal range of motion.   Skin:     General: Skin is warm and dry.      Findings: No erythema.   Neurological:      General: No focal deficit present.      Mental Status: She is alert and oriented to person, place, and time.   Psychiatric:         Mood and Affect: Mood normal.         Behavior: Behavior normal.         Thought Content: Thought content normal.         Judgment: Judgment normal.         Laboratory Data:   Lab Results - Last 18 Months   Lab Units 22  1244   GLUCOSE mg/dL 99 79   BUN mg/dL 8 7   CREATININE mg/dL 0.72 0.66   SODIUM mmol/L 139 138   POTASSIUM mmol/L 3.9 4.0   CHLORIDE mmol/L 103 103   CO2 mmol/L 26.7 24.9   CALCIUM mg/dL 9.4 9.3   TOTAL PROTEIN g/dL 7.3 7.4   ALBUMIN g/dL 4.80 4.70   ALT (SGPT) U/L 6 8   AST (SGOT) U/L 12 15   ALK PHOS U/L 50 44   BILIRUBIN mg/dL <0.2 0.3   EGFR IF NONAFRICN AM mL/min/1.73  --  106   GLOBULIN gm/dL 2.5 2.7   A/G RATIO g/dL 1.9 1.7   BUN / CREAT RATIO  11.1 10.6   ANION GAP mmol/L 9.3 10.1     Lab Results - Last 18 Months   Lab Units 22  1534 " 12/02/21  1244   WBC 10*3/mm3 5.83 6.44   RBC 10*6/mm3 4.28 4.21   HEMOGLOBIN g/dL 12.8 12.8   HEMATOCRIT % 38.7 37.5   MCV fL 90.4 89.1   MCH pg 29.9 30.4   MCHC g/dL 33.1 34.1   RDW % 12.5 12.2*   RDW-SD fl 41.2 39.7   MPV fL 11.9 11.4   PLATELETS 10*3/mm3 182 154     No results for input(s): HCGQUAL in the last 25850 hours.    Last Pap smear:   Last Completed Pap Smear          PAP SMEAR (Every 3 Years) Next due on 1/11/2025 01/11/2022  Liquid-based Pap Smear, Screening    12/28/2021  Liquid-based Pap Smear, Screening            Up-to-date  Last mammography:   Last Completed Mammogram     This patient has no relevant Health Maintenance data.      Does not meet age criteria    Assessment/Plan   Diagnoses and all orders for this visit:    1. Abnormal uterine bleeding (AUB) (Primary)    2. Pelvic pain    3. Intermenstrual bleeding    Other orders  -     norgestimate-ethinyl estradiol (Sprintec 28) 0.25-35 MG-MCG per tablet; Take 1 tablet by mouth Daily for 30 days.  Dispense: 30 tablet; Refill: 11      Patient with abnormal bleeding unclear etiology at this time.  Will do trial of combined oral contraceptive pills, will have patient return to see me in 1 month to see if this is helping.  If no improvement will discuss either ablation or hysterectomy.  Patient to return to see me sooner as needed.    This document has been electronically signed by Van Cordova DO on April 5, 2022 20:49 CDT

## 2022-05-04 ENCOUNTER — OFFICE VISIT (OUTPATIENT)
Dept: OBSTETRICS AND GYNECOLOGY | Facility: CLINIC | Age: 32
End: 2022-05-04

## 2022-05-04 VITALS
BODY MASS INDEX: 18.61 KG/M2 | DIASTOLIC BLOOD PRESSURE: 58 MMHG | SYSTOLIC BLOOD PRESSURE: 98 MMHG | HEIGHT: 60 IN | WEIGHT: 94.8 LBS

## 2022-05-04 DIAGNOSIS — N92.3 INTERMENSTRUAL BLEEDING: ICD-10-CM

## 2022-05-04 DIAGNOSIS — N93.9 ABNORMAL UTERINE BLEEDING (AUB): Primary | ICD-10-CM

## 2022-05-04 DIAGNOSIS — R10.2 PELVIC PAIN: ICD-10-CM

## 2022-05-04 DIAGNOSIS — N80.03 ADENOMYOSIS: ICD-10-CM

## 2022-05-04 PROBLEM — O41.1230 CHORIOAMNIONITIS IN THIRD TRIMESTER: Status: RESOLVED | Noted: 2020-03-16 | Resolved: 2022-05-04

## 2022-05-04 PROCEDURE — 99214 OFFICE O/P EST MOD 30 MIN: CPT | Performed by: OBSTETRICS & GYNECOLOGY

## 2022-05-04 RX ORDER — SODIUM CHLORIDE 0.9 % (FLUSH) 0.9 %
10 SYRINGE (ML) INJECTION AS NEEDED
Status: CANCELLED | OUTPATIENT
Start: 2022-05-17

## 2022-05-04 RX ORDER — SODIUM CHLORIDE, SODIUM LACTATE, POTASSIUM CHLORIDE, CALCIUM CHLORIDE 600; 310; 30; 20 MG/100ML; MG/100ML; MG/100ML; MG/100ML
125 INJECTION, SOLUTION INTRAVENOUS CONTINUOUS
Status: CANCELLED | OUTPATIENT
Start: 2022-05-17

## 2022-05-04 RX ORDER — SODIUM CHLORIDE 0.9 % (FLUSH) 0.9 %
3 SYRINGE (ML) INJECTION EVERY 12 HOURS SCHEDULED
Status: CANCELLED | OUTPATIENT
Start: 2022-05-17

## 2022-05-05 ENCOUNTER — OFFICE VISIT (OUTPATIENT)
Dept: FAMILY MEDICINE CLINIC | Facility: CLINIC | Age: 32
End: 2022-05-05

## 2022-05-05 VITALS
HEIGHT: 60 IN | BODY MASS INDEX: 18.55 KG/M2 | HEART RATE: 101 BPM | OXYGEN SATURATION: 100 % | WEIGHT: 94.5 LBS | DIASTOLIC BLOOD PRESSURE: 60 MMHG | SYSTOLIC BLOOD PRESSURE: 100 MMHG

## 2022-05-05 DIAGNOSIS — H91.92 HEARING LOSS OF LEFT EAR, UNSPECIFIED HEARING LOSS TYPE: ICD-10-CM

## 2022-05-05 DIAGNOSIS — H92.02 LEFT EAR PAIN: Primary | ICD-10-CM

## 2022-05-05 PROBLEM — N92.3 INTERMENSTRUAL BLEEDING: Status: ACTIVE | Noted: 2022-05-05

## 2022-05-05 PROBLEM — N80.03 ADENOMYOSIS: Status: ACTIVE | Noted: 2022-05-05

## 2022-05-05 PROBLEM — R10.2 PELVIC PAIN: Status: ACTIVE | Noted: 2022-05-05

## 2022-05-05 PROBLEM — N93.9 ABNORMAL UTERINE BLEEDING (AUB): Status: ACTIVE | Noted: 2022-05-05

## 2022-05-05 PROCEDURE — 99214 OFFICE O/P EST MOD 30 MIN: CPT | Performed by: FAMILY MEDICINE

## 2022-05-05 RX ORDER — AMOXICILLIN 500 MG/1
500 CAPSULE ORAL 3 TIMES DAILY
Qty: 30 CAPSULE | Refills: 0 | Status: SHIPPED | OUTPATIENT
Start: 2022-05-05 | End: 2022-06-08

## 2022-05-05 RX ORDER — HYDROXYZINE PAMOATE 25 MG/1
25 CAPSULE ORAL 3 TIMES DAILY PRN
Qty: 90 CAPSULE | Refills: 3 | Status: CANCELLED | OUTPATIENT
Start: 2022-05-05

## 2022-05-05 NOTE — PROGRESS NOTES
Subjective   Christiana Aguirre is a 31 y.o. female.   Cc: follow up of chronic medical issues    HPI  The patient was awakened this morning by left sided ear pain. She blew her nose and the pain was worse. She lay down there was bloody fluid on her pillow and she cant hear on that side.  The following portions of the patient's history were reviewed and updated as appropriate: allergies, current medications, past family history, past medical history, past social history, past surgical history and problem list.    Review of Systems   Constitutional: Positive for fatigue. Negative for fever.   HENT: Positive for congestion, postnasal drip and rhinorrhea. Negative for sinus pressure and sinus pain.    Respiratory: Positive for cough. Negative for shortness of breath.    Cardiovascular: Negative for chest pain and palpitations.       Objective   Physical Exam  Vitals reviewed.   Constitutional:       Appearance: Normal appearance.   HENT:      Head: Normocephalic and atraumatic.      Right Ear: Tympanic membrane, ear canal and external ear normal.      Ears:      Comments: Left TM is erythematous. There appears to be a fluid in the external auditory canal.     Nose: Nose normal.      Mouth/Throat:      Mouth: Mucous membranes are moist.      Pharynx: Oropharynx is clear.   Cardiovascular:      Rate and Rhythm: Normal rate.      Heart sounds: Normal heart sounds. No murmur heard.    No friction rub. No gallop.   Pulmonary:      Effort: Pulmonary effort is normal. No respiratory distress.      Breath sounds: Normal breath sounds. No stridor. No wheezing, rhonchi or rales.   Chest:      Chest wall: No tenderness.   Abdominal:      General: Bowel sounds are normal. There is no distension.      Palpations: Abdomen is soft. There is no mass.      Tenderness: There is no abdominal tenderness. There is no guarding or rebound.      Hernia: No hernia is present.   Musculoskeletal:      Cervical back: Normal range of motion.  "  Skin:     General: Skin is warm and dry.   Neurological:      Mental Status: She is alert.           Visit Vitals  /60   Pulse 101   Ht 152.4 cm (60\")   Wt 42.9 kg (94 lb 8 oz)   LMP 04/27/2022 (Exact Date)   SpO2 100%   Breastfeeding No   BMI 18.46 kg/m²     Body mass index is 18.46 kg/m².      Assessment/Plan   Diagnoses and all orders for this visit:    1. Left ear pain (Primary)    2. Hearing loss of left ear, unspecified hearing loss type    Other orders  -     amoxicillin (AMOXIL) 500 MG capsule; Take 1 capsule by mouth 3 (Three) Times a Day.  Dispense: 30 capsule; Refill: 0    I called ENT and they are going to call Dr. Watson and get her in to see him since she has lost hearing on that side.  She is to see Dr. Watson when an appointment is given.   I reviewed her CBC and C<MP with the patient.  "

## 2022-05-05 NOTE — PROGRESS NOTES
Christiana Aguirre is a 31 y.o. y/o female.     Chief Complaint: Pelvic pain and vaginal bleeding    HPI:   31 y.o. .  Patient's last menstrual period was 2022 (exact date)..  The patient presents for follow-up on pelvic pain and vaginal bleeding.  Patient states since starting birth control pills bleeding and clots have gotten worse.  Again reviewed options.  Patient would prefer to move forward with endometrial ablation at this time.  Patient's  has had vasectomy and will be using this as contraception.  Discussed risks and benefits and patient desires to proceed.    I discussed the risks of endometrial ablation with hysteroscopy and dilation and curettage. I discussed the risk of failure. I discussed the risks of serious complications including bowel, bladder, ureter and vaginal injury. I discussed the risk of uterine perforation.  I reviewed the risk of serious uterine adhesions with severe serious pelvic pain requiring a hysterectomy. I discussed the risk of bleeding with the patient and possible risk of blood transfusion.  Blood products carry risk of HIV, infection, hepatitis, and transfusion reaction. Patient is willing to accept blood products. She understands the risk of infection in the abdominal wall, pelvis, abdomen and other parts of the body. I reviewed the risk of hysterectomy for failure or for pain. I reviewed the method that I use is Raine and the strengths and weaknesses of it. Questions answered at length. Patient expressed understanding of the risks and desires to proceed with Raine endometrial ablation.     Review of Systems   Constitutional: Negative for chills, fatigue and fever.   HENT: Negative for sore throat.    Eyes: Negative for visual disturbance.   Respiratory: Negative for cough, shortness of breath and wheezing.    Cardiovascular: Negative for chest pain, palpitations and leg swelling.   Gastrointestinal: Negative for abdominal pain, diarrhea, nausea and  vomiting.   Endocrine: Negative for cold intolerance and heat intolerance.   Genitourinary: Positive for menstrual problem, pelvic pain and vaginal bleeding. Negative for dysuria, flank pain, frequency, vaginal discharge and vaginal pain.   Skin: Negative for color change and pallor.   Neurological: Negative for syncope, light-headedness and headaches.   Psychiatric/Behavioral: Negative for dysphoric mood and suicidal ideas. The patient is not nervous/anxious.         The following portions of the patient's history were reviewed and updated as appropriate: allergies, current medications, past family history, past medical history, past social history, past surgical history and problem list.    No Known Allergies     Prior to Admission medications    Medication Sig Start Date End Date Taking? Authorizing Provider   hydrOXYzine pamoate (Vistaril) 25 MG capsule Take 1 capsule by mouth 3 (Three) Times a Day As Needed for Itching or Anxiety. 3/8/22  Yes Jonatan Layton MD   norgestimate-ethinyl estradiol (Sprintec 28) 0.25-35 MG-MCG per tablet Take 1 tablet by mouth Daily for 30 days. 22 Yes Van Cordova DO   amoxicillin (AMOXIL) 500 MG capsule Take 1 capsule by mouth 3 (Three) Times a Day. 22   Jonatan Layton MD        The patient has a family history of   Family History   Problem Relation Age of Onset   • Diverticulitis Mother    • DEBBY disease Mother    • Anxiety disorder Mother    • Arthritis Mother    • Depression Mother    • Thyroid disease Mother    • COPD Father    • Heart disease Father         Past Medical History:   Diagnosis Date   • Anemia 2016    IRON INFUSION AFTER DELIVERY   • Anxiety    • Anxiety 2017   • Right sciatic nerve pain    • Varicella         OB History        4    Para   3    Term   3            AB   1    Living   3       SAB        IAB        Ectopic        Molar        Multiple   0    Live Births   3                 Social History  "    Socioeconomic History   • Marital status:    Tobacco Use   • Smoking status: Current Every Day Smoker     Packs/day: 0.50     Types: Cigarettes   • Smokeless tobacco: Never Used   Vaping Use   • Vaping Use: Never used   Substance and Sexual Activity   • Alcohol use: No   • Drug use: No   • Sexual activity: Yes     Partners: Male     Birth control/protection: Surgical        Past Surgical History:   Procedure Laterality Date   • TEETH EXTRACTION  06/2017        Patient Active Problem List   Diagnosis   • Tobacco use   • Right sided sciatica   • Anxiety   • Abnormal uterine bleeding (AUB)   • Pelvic pain   • Intermenstrual bleeding   • Adenomyosis        Documented Vitals    05/04/22 1607   BP: 98/58   Weight: 43 kg (94 lb 12.8 oz)   Height: 152.4 cm (60\")        Body mass index is 18.51 kg/m².    Physical Exam  Vitals and nursing note reviewed.   Constitutional:       General: She is not in acute distress.     Appearance: Normal appearance. She is well-developed. She is not ill-appearing, toxic-appearing or diaphoretic.   HENT:      Head: Normocephalic.      Mouth/Throat:      Mouth: Mucous membranes are moist.   Neck:      Thyroid: No thyromegaly.   Genitourinary:     Vagina: Normal.   Musculoskeletal:         General: No swelling, tenderness or deformity. Normal range of motion.      Cervical back: Normal range of motion.   Skin:     General: Skin is warm and dry.      Findings: No erythema.   Neurological:      General: No focal deficit present.      Mental Status: She is alert and oriented to person, place, and time.   Psychiatric:         Mood and Affect: Mood normal.         Behavior: Behavior normal.         Thought Content: Thought content normal.         Judgment: Judgment normal.         Laboratory Data:   Lab Results - Last 18 Months   Lab Units 03/22/22  1534 12/02/21  1244   GLUCOSE mg/dL 99 79   BUN mg/dL 8 7   CREATININE mg/dL 0.72 0.66   SODIUM mmol/L 139 138   POTASSIUM mmol/L 3.9 4.0 "   CHLORIDE mmol/L 103 103   CO2 mmol/L 26.7 24.9   CALCIUM mg/dL 9.4 9.3   TOTAL PROTEIN g/dL 7.3 7.4   ALBUMIN g/dL 4.80 4.70   ALT (SGPT) U/L 6 8   AST (SGOT) U/L 12 15   ALK PHOS U/L 50 44   BILIRUBIN mg/dL <0.2 0.3   EGFR IF NONAFRICN AM mL/min/1.73  --  106   GLOBULIN gm/dL 2.5 2.7   A/G RATIO g/dL 1.9 1.7   BUN / CREAT RATIO  11.1 10.6   ANION GAP mmol/L 9.3 10.1     Lab Results - Last 18 Months   Lab Units 03/22/22  1534 12/02/21  1244   WBC 10*3/mm3 5.83 6.44   RBC 10*6/mm3 4.28 4.21   HEMOGLOBIN g/dL 12.8 12.8   HEMATOCRIT % 38.7 37.5   MCV fL 90.4 89.1   MCH pg 29.9 30.4   MCHC g/dL 33.1 34.1   RDW % 12.5 12.2*   RDW-SD fl 41.2 39.7   MPV fL 11.9 11.4   PLATELETS 10*3/mm3 182 154     No results for input(s): HCGQUAL in the last 75499 hours.    Assessment/Plan   Diagnoses and all orders for this visit:    1. Abnormal uterine bleeding (AUB) (Primary)  -     Case Request; Standing  -     CBC and Differential; Future  -     hCG, Serum, QUALITATIVE; Future  -     Case Request    2. Pelvic pain  -     Case Request; Standing  -     CBC and Differential; Future  -     hCG, Serum, QUALITATIVE; Future  -     Case Request    3. Intermenstrual bleeding  -     Case Request; Standing  -     CBC and Differential; Future  -     hCG, Serum, QUALITATIVE; Future  -     Case Request    4. Adenomyosis  -     Case Request; Standing  -     CBC and Differential; Future  -     hCG, Serum, QUALITATIVE; Future  -     Case Request    Other orders  -     Follow Anesthesia Guidelines / Standing Orders; Future  -     Chlorhexidine Skin Prep; Future      Patient with abnormal uterine bleeding desiring more definitive management after having failed medical management.  We will plan to do endometrial ablation to treat bleeding, plan to do this on 17 May.  Patient to return to see me approximately 2 weeks after surgery.  Return sooner as needed.    This document has been electronically signed by Van Cordova DO on May 5, 2022  17:06 CDT

## 2022-05-05 NOTE — H&P (VIEW-ONLY)
Christiana Aguirre is a 31 y.o. y/o female.     Chief Complaint: Pelvic pain and vaginal bleeding    HPI:   31 y.o. .  Patient's last menstrual period was 2022 (exact date)..  The patient presents for follow-up on pelvic pain and vaginal bleeding.  Patient states since starting birth control pills bleeding and clots have gotten worse.  Again reviewed options.  Patient would prefer to move forward with endometrial ablation at this time.  Patient's  has had vasectomy and will be using this as contraception.  Discussed risks and benefits and patient desires to proceed.    I discussed the risks of endometrial ablation with hysteroscopy and dilation and curettage. I discussed the risk of failure. I discussed the risks of serious complications including bowel, bladder, ureter and vaginal injury. I discussed the risk of uterine perforation.  I reviewed the risk of serious uterine adhesions with severe serious pelvic pain requiring a hysterectomy. I discussed the risk of bleeding with the patient and possible risk of blood transfusion.  Blood products carry risk of HIV, infection, hepatitis, and transfusion reaction. Patient is willing to accept blood products. She understands the risk of infection in the abdominal wall, pelvis, abdomen and other parts of the body. I reviewed the risk of hysterectomy for failure or for pain. I reviewed the method that I use is Raine and the strengths and weaknesses of it. Questions answered at length. Patient expressed understanding of the risks and desires to proceed with Raine endometrial ablation.     Review of Systems   Constitutional: Negative for chills, fatigue and fever.   HENT: Negative for sore throat.    Eyes: Negative for visual disturbance.   Respiratory: Negative for cough, shortness of breath and wheezing.    Cardiovascular: Negative for chest pain, palpitations and leg swelling.   Gastrointestinal: Negative for abdominal pain, diarrhea, nausea and  vomiting.   Endocrine: Negative for cold intolerance and heat intolerance.   Genitourinary: Positive for menstrual problem, pelvic pain and vaginal bleeding. Negative for dysuria, flank pain, frequency, vaginal discharge and vaginal pain.   Skin: Negative for color change and pallor.   Neurological: Negative for syncope, light-headedness and headaches.   Psychiatric/Behavioral: Negative for dysphoric mood and suicidal ideas. The patient is not nervous/anxious.         The following portions of the patient's history were reviewed and updated as appropriate: allergies, current medications, past family history, past medical history, past social history, past surgical history and problem list.    No Known Allergies     Prior to Admission medications    Medication Sig Start Date End Date Taking? Authorizing Provider   hydrOXYzine pamoate (Vistaril) 25 MG capsule Take 1 capsule by mouth 3 (Three) Times a Day As Needed for Itching or Anxiety. 3/8/22  Yes Jonatan Layton MD   norgestimate-ethinyl estradiol (Sprintec 28) 0.25-35 MG-MCG per tablet Take 1 tablet by mouth Daily for 30 days. 22 Yes Van Cordova DO   amoxicillin (AMOXIL) 500 MG capsule Take 1 capsule by mouth 3 (Three) Times a Day. 22   Jonatan Layton MD        The patient has a family history of   Family History   Problem Relation Age of Onset   • Diverticulitis Mother    • DEBBY disease Mother    • Anxiety disorder Mother    • Arthritis Mother    • Depression Mother    • Thyroid disease Mother    • COPD Father    • Heart disease Father         Past Medical History:   Diagnosis Date   • Anemia 2016    IRON INFUSION AFTER DELIVERY   • Anxiety    • Anxiety 2017   • Right sciatic nerve pain    • Varicella         OB History        4    Para   3    Term   3            AB   1    Living   3       SAB        IAB        Ectopic        Molar        Multiple   0    Live Births   3                 Social History  "    Socioeconomic History   • Marital status:    Tobacco Use   • Smoking status: Current Every Day Smoker     Packs/day: 0.50     Types: Cigarettes   • Smokeless tobacco: Never Used   Vaping Use   • Vaping Use: Never used   Substance and Sexual Activity   • Alcohol use: No   • Drug use: No   • Sexual activity: Yes     Partners: Male     Birth control/protection: Surgical        Past Surgical History:   Procedure Laterality Date   • TEETH EXTRACTION  06/2017        Patient Active Problem List   Diagnosis   • Tobacco use   • Right sided sciatica   • Anxiety   • Abnormal uterine bleeding (AUB)   • Pelvic pain   • Intermenstrual bleeding   • Adenomyosis        Documented Vitals    05/04/22 1607   BP: 98/58   Weight: 43 kg (94 lb 12.8 oz)   Height: 152.4 cm (60\")        Body mass index is 18.51 kg/m².    Physical Exam  Vitals and nursing note reviewed.   Constitutional:       General: She is not in acute distress.     Appearance: Normal appearance. She is well-developed. She is not ill-appearing, toxic-appearing or diaphoretic.   HENT:      Head: Normocephalic.      Mouth/Throat:      Mouth: Mucous membranes are moist.   Neck:      Thyroid: No thyromegaly.   Genitourinary:     Vagina: Normal.   Musculoskeletal:         General: No swelling, tenderness or deformity. Normal range of motion.      Cervical back: Normal range of motion.   Skin:     General: Skin is warm and dry.      Findings: No erythema.   Neurological:      General: No focal deficit present.      Mental Status: She is alert and oriented to person, place, and time.   Psychiatric:         Mood and Affect: Mood normal.         Behavior: Behavior normal.         Thought Content: Thought content normal.         Judgment: Judgment normal.         Laboratory Data:   Lab Results - Last 18 Months   Lab Units 03/22/22  1534 12/02/21  1244   GLUCOSE mg/dL 99 79   BUN mg/dL 8 7   CREATININE mg/dL 0.72 0.66   SODIUM mmol/L 139 138   POTASSIUM mmol/L 3.9 4.0 "   CHLORIDE mmol/L 103 103   CO2 mmol/L 26.7 24.9   CALCIUM mg/dL 9.4 9.3   TOTAL PROTEIN g/dL 7.3 7.4   ALBUMIN g/dL 4.80 4.70   ALT (SGPT) U/L 6 8   AST (SGOT) U/L 12 15   ALK PHOS U/L 50 44   BILIRUBIN mg/dL <0.2 0.3   EGFR IF NONAFRICN AM mL/min/1.73  --  106   GLOBULIN gm/dL 2.5 2.7   A/G RATIO g/dL 1.9 1.7   BUN / CREAT RATIO  11.1 10.6   ANION GAP mmol/L 9.3 10.1     Lab Results - Last 18 Months   Lab Units 03/22/22  1534 12/02/21  1244   WBC 10*3/mm3 5.83 6.44   RBC 10*6/mm3 4.28 4.21   HEMOGLOBIN g/dL 12.8 12.8   HEMATOCRIT % 38.7 37.5   MCV fL 90.4 89.1   MCH pg 29.9 30.4   MCHC g/dL 33.1 34.1   RDW % 12.5 12.2*   RDW-SD fl 41.2 39.7   MPV fL 11.9 11.4   PLATELETS 10*3/mm3 182 154     No results for input(s): HCGQUAL in the last 34002 hours.    Assessment/Plan   Diagnoses and all orders for this visit:    1. Abnormal uterine bleeding (AUB) (Primary)  -     Case Request; Standing  -     CBC and Differential; Future  -     hCG, Serum, QUALITATIVE; Future  -     Case Request    2. Pelvic pain  -     Case Request; Standing  -     CBC and Differential; Future  -     hCG, Serum, QUALITATIVE; Future  -     Case Request    3. Intermenstrual bleeding  -     Case Request; Standing  -     CBC and Differential; Future  -     hCG, Serum, QUALITATIVE; Future  -     Case Request    4. Adenomyosis  -     Case Request; Standing  -     CBC and Differential; Future  -     hCG, Serum, QUALITATIVE; Future  -     Case Request    Other orders  -     Follow Anesthesia Guidelines / Standing Orders; Future  -     Chlorhexidine Skin Prep; Future      Patient with abnormal uterine bleeding desiring more definitive management after having failed medical management.  We will plan to do endometrial ablation to treat bleeding, plan to do this on 17 May.  Patient to return to see me approximately 2 weeks after surgery.  Return sooner as needed.    This document has been electronically signed by Van Cordova DO on May 5, 2022  17:06 CDT

## 2022-05-06 ENCOUNTER — OFFICE VISIT (OUTPATIENT)
Dept: OTOLARYNGOLOGY | Facility: CLINIC | Age: 32
End: 2022-05-06

## 2022-05-06 VITALS — HEIGHT: 60 IN | TEMPERATURE: 98.7 F | BODY MASS INDEX: 18.97 KG/M2 | WEIGHT: 96.6 LBS

## 2022-05-06 DIAGNOSIS — H66.012 ACUTE SUPPURATIVE OTITIS MEDIA OF LEFT EAR WITH SPONTANEOUS RUPTURE OF TYMPANIC MEMBRANE, RECURRENCE NOT SPECIFIED: Primary | ICD-10-CM

## 2022-05-06 PROCEDURE — 99204 OFFICE O/P NEW MOD 45 MIN: CPT | Performed by: OTOLARYNGOLOGY

## 2022-05-06 RX ORDER — OFLOXACIN 3 MG/ML
5 SOLUTION AURICULAR (OTIC) 2 TIMES DAILY
Qty: 10 ML | Refills: 0 | Status: SHIPPED | OUTPATIENT
Start: 2022-05-06 | End: 2022-05-16

## 2022-05-06 NOTE — PROGRESS NOTES
Subjective   Christiana Aguirre is a 31 y.o. female.       History of Present Illness   Patient reports that she recently had a sinus infection and then 2 nights ago developed acute severe left ear pain and clear to bloody otorrhea with markedly decreased hearing.  Was seen elsewhere and given amoxicillin.  Continues to have drainage from the ear and decreased hearing.  Did not have tubes as a child.      The following portions of the patient's history were reviewed and updated as appropriate: allergies, current medications, past family history, past medical history, past social history, past surgical history and problem list.     reports that she has been smoking cigarettes. She has been smoking about 0.50 packs per day. She has never used smokeless tobacco. She reports that she does not drink alcohol and does not use drugs.   Patient is a tobacco user and has been counseled for use of tobacco products      Review of Systems        Objective   Physical Exam  Ears: Extremities no deformity.  Right ear canal shows no discharge.  Tympanic membrane intact and clear.  Left ear canal shows mucopurulent discharge medially that is cleaned under the microscope.  Tympanic membrane is intact, bulging, consistent with recent rupture.  Nares: Boggy mucosa with some mucoid secretions in the right naris  Oral cavity: No masses or lesions  Pharynx: No erythema or exudate  Neck: No adenopathy or mass  512 Hz tuning fork localizes to the left consistent with a conductive hearing loss on the left    Assessment/Plan   Diagnoses and all orders for this visit:    1. Acute suppurative otitis media of left ear with spontaneous rupture of tympanic membrane, recurrence not specified (Primary)    Other orders  -     ofloxacin (FLOXIN) 0.3 % otic solution; Administer 5 drops into the left ear 2 (Two) Times a Day.  Dispense: 10 mL; Refill: 0        Plan: Ear cleaned as described above.  Add ofloxacin 5 drops to the left ear twice a day for  5 days.  Finish the oral antibiotics.  Explained that the infection is likely improving but it may take several weeks for her hearing to return.  Reevaluate in 1 month.  Call for problems.

## 2022-05-16 ENCOUNTER — PRE-ADMISSION TESTING (OUTPATIENT)
Dept: PREADMISSION TESTING | Facility: HOSPITAL | Age: 32
End: 2022-05-16

## 2022-05-16 ENCOUNTER — ANESTHESIA EVENT (OUTPATIENT)
Dept: PERIOP | Facility: HOSPITAL | Age: 32
End: 2022-05-16

## 2022-05-16 VITALS
HEIGHT: 60 IN | DIASTOLIC BLOOD PRESSURE: 64 MMHG | BODY MASS INDEX: 18.65 KG/M2 | SYSTOLIC BLOOD PRESSURE: 98 MMHG | OXYGEN SATURATION: 98 % | RESPIRATION RATE: 16 BRPM | HEART RATE: 90 BPM | WEIGHT: 95 LBS

## 2022-05-16 DIAGNOSIS — N93.9 ABNORMAL UTERINE BLEEDING (AUB): ICD-10-CM

## 2022-05-16 DIAGNOSIS — N92.3 INTERMENSTRUAL BLEEDING: ICD-10-CM

## 2022-05-16 DIAGNOSIS — R10.2 PELVIC PAIN: ICD-10-CM

## 2022-05-16 DIAGNOSIS — N80.03 ADENOMYOSIS: ICD-10-CM

## 2022-05-16 LAB
ABO GROUP BLD: NORMAL
BASOPHILS # BLD AUTO: 0.09 10*3/MM3 (ref 0–0.2)
BASOPHILS NFR BLD AUTO: 1.5 % (ref 0–1.5)
BLD GP AB SCN SERPL QL: NEGATIVE
DEPRECATED RDW RBC AUTO: 37.7 FL (ref 37–54)
EOSINOPHIL # BLD AUTO: 0.1 10*3/MM3 (ref 0–0.4)
EOSINOPHIL NFR BLD AUTO: 1.7 % (ref 0.3–6.2)
ERYTHROCYTE [DISTWIDTH] IN BLOOD BY AUTOMATED COUNT: 11.9 % (ref 12.3–15.4)
HCG SERPL QL: NEGATIVE
HCT VFR BLD AUTO: 35.2 % (ref 34–46.6)
HGB BLD-MCNC: 12.4 G/DL (ref 12–15.9)
IMM GRANULOCYTES # BLD AUTO: 0.01 10*3/MM3 (ref 0–0.05)
IMM GRANULOCYTES NFR BLD AUTO: 0.2 % (ref 0–0.5)
LYMPHOCYTES # BLD AUTO: 2.1 10*3/MM3 (ref 0.7–3.1)
LYMPHOCYTES NFR BLD AUTO: 35.7 % (ref 19.6–45.3)
Lab: NORMAL
MCH RBC QN AUTO: 30.2 PG (ref 26.6–33)
MCHC RBC AUTO-ENTMCNC: 35.2 G/DL (ref 31.5–35.7)
MCV RBC AUTO: 85.9 FL (ref 79–97)
MONOCYTES # BLD AUTO: 0.47 10*3/MM3 (ref 0.1–0.9)
MONOCYTES NFR BLD AUTO: 8 % (ref 5–12)
NEUTROPHILS NFR BLD AUTO: 3.11 10*3/MM3 (ref 1.7–7)
NEUTROPHILS NFR BLD AUTO: 52.9 % (ref 42.7–76)
NRBC BLD AUTO-RTO: 0 /100 WBC (ref 0–0.2)
PLATELET # BLD AUTO: 229 10*3/MM3 (ref 140–450)
PMV BLD AUTO: 10.2 FL (ref 6–12)
RBC # BLD AUTO: 4.1 10*6/MM3 (ref 3.77–5.28)
RH BLD: POSITIVE
T&S EXPIRATION DATE: NORMAL
WBC NRBC COR # BLD: 5.88 10*3/MM3 (ref 3.4–10.8)

## 2022-05-16 PROCEDURE — 84703 CHORIONIC GONADOTROPIN ASSAY: CPT

## 2022-05-16 PROCEDURE — 86901 BLOOD TYPING SEROLOGIC RH(D): CPT

## 2022-05-16 PROCEDURE — 86900 BLOOD TYPING SEROLOGIC ABO: CPT

## 2022-05-16 PROCEDURE — 86850 RBC ANTIBODY SCREEN: CPT

## 2022-05-16 PROCEDURE — 85025 COMPLETE CBC W/AUTO DIFF WBC: CPT

## 2022-05-16 PROCEDURE — 36415 COLL VENOUS BLD VENIPUNCTURE: CPT

## 2022-05-17 ENCOUNTER — ANESTHESIA (OUTPATIENT)
Dept: PERIOP | Facility: HOSPITAL | Age: 32
End: 2022-05-17

## 2022-05-17 ENCOUNTER — HOSPITAL ENCOUNTER (OUTPATIENT)
Facility: HOSPITAL | Age: 32
Setting detail: HOSPITAL OUTPATIENT SURGERY
Discharge: HOME | End: 2022-05-17
Attending: OBSTETRICS & GYNECOLOGY | Admitting: OBSTETRICS & GYNECOLOGY

## 2022-05-17 VITALS
TEMPERATURE: 97.3 F | RESPIRATION RATE: 16 BRPM | HEART RATE: 58 BPM | SYSTOLIC BLOOD PRESSURE: 104 MMHG | DIASTOLIC BLOOD PRESSURE: 58 MMHG | BODY MASS INDEX: 18.44 KG/M2 | HEIGHT: 60 IN | OXYGEN SATURATION: 98 % | WEIGHT: 93.92 LBS

## 2022-05-17 DIAGNOSIS — N92.3 INTERMENSTRUAL BLEEDING: ICD-10-CM

## 2022-05-17 DIAGNOSIS — R10.2 PELVIC PAIN: ICD-10-CM

## 2022-05-17 DIAGNOSIS — N80.03 ADENOMYOSIS: ICD-10-CM

## 2022-05-17 DIAGNOSIS — Z98.890 STATUS POST ENDOMETRIAL ABLATION: Primary | ICD-10-CM

## 2022-05-17 DIAGNOSIS — N93.9 ABNORMAL UTERINE BLEEDING (AUB): ICD-10-CM

## 2022-05-17 LAB
ABO GROUP BLD: NORMAL
B-HCG UR QL: NEGATIVE
BLD GP AB SCN SERPL QL: NEGATIVE
Lab: NORMAL
RH BLD: POSITIVE
T&S EXPIRATION DATE: NORMAL

## 2022-05-17 PROCEDURE — 58563 HYSTEROSCOPY ABLATION: CPT | Performed by: SPECIALIST/TECHNOLOGIST, OTHER

## 2022-05-17 PROCEDURE — 25010000002 PHENYLEPHRINE 10 MG/ML SOLUTION: Performed by: NURSE ANESTHETIST, CERTIFIED REGISTERED

## 2022-05-17 PROCEDURE — 86901 BLOOD TYPING SEROLOGIC RH(D): CPT | Performed by: OBSTETRICS & GYNECOLOGY

## 2022-05-17 PROCEDURE — 25010000002 FENTANYL CITRATE (PF) 50 MCG/ML SOLUTION: Performed by: NURSE ANESTHETIST, CERTIFIED REGISTERED

## 2022-05-17 PROCEDURE — 25010000002 DEXAMETHASONE PER 1 MG: Performed by: NURSE ANESTHETIST, CERTIFIED REGISTERED

## 2022-05-17 PROCEDURE — 58563 HYSTEROSCOPY ABLATION: CPT | Performed by: OBSTETRICS & GYNECOLOGY

## 2022-05-17 PROCEDURE — 25010000002 ONDANSETRON PER 1 MG: Performed by: NURSE ANESTHETIST, CERTIFIED REGISTERED

## 2022-05-17 PROCEDURE — 25010000002 KETOROLAC TROMETHAMINE PER 15 MG: Performed by: NURSE ANESTHETIST, CERTIFIED REGISTERED

## 2022-05-17 PROCEDURE — 81025 URINE PREGNANCY TEST: CPT | Performed by: OBSTETRICS & GYNECOLOGY

## 2022-05-17 PROCEDURE — 25010000002 PROPOFOL 10 MG/ML EMULSION: Performed by: NURSE ANESTHETIST, CERTIFIED REGISTERED

## 2022-05-17 PROCEDURE — 25010000002 MIDAZOLAM PER 1 MG: Performed by: NURSE ANESTHETIST, CERTIFIED REGISTERED

## 2022-05-17 PROCEDURE — 25010000002 HYDROMORPHONE 1 MG/ML SOLUTION: Performed by: NURSE ANESTHETIST, CERTIFIED REGISTERED

## 2022-05-17 PROCEDURE — 86850 RBC ANTIBODY SCREEN: CPT | Performed by: OBSTETRICS & GYNECOLOGY

## 2022-05-17 PROCEDURE — 86900 BLOOD TYPING SEROLOGIC ABO: CPT | Performed by: OBSTETRICS & GYNECOLOGY

## 2022-05-17 RX ORDER — FLUMAZENIL 0.1 MG/ML
0.2 INJECTION INTRAVENOUS AS NEEDED
Status: DISCONTINUED | OUTPATIENT
Start: 2022-05-17 | End: 2022-05-17 | Stop reason: HOSPADM

## 2022-05-17 RX ORDER — HYDROCODONE BITARTRATE AND ACETAMINOPHEN 5; 325 MG/1; MG/1
1 TABLET ORAL EVERY 4 HOURS PRN
Qty: 5 TABLET | Refills: 0 | Status: SHIPPED | OUTPATIENT
Start: 2022-05-17 | End: 2022-06-08

## 2022-05-17 RX ORDER — ONDANSETRON 2 MG/ML
INJECTION INTRAMUSCULAR; INTRAVENOUS AS NEEDED
Status: DISCONTINUED | OUTPATIENT
Start: 2022-05-17 | End: 2022-05-17 | Stop reason: SURG

## 2022-05-17 RX ORDER — MIDAZOLAM HYDROCHLORIDE 1 MG/ML
INJECTION INTRAMUSCULAR; INTRAVENOUS AS NEEDED
Status: DISCONTINUED | OUTPATIENT
Start: 2022-05-17 | End: 2022-05-17 | Stop reason: SURG

## 2022-05-17 RX ORDER — PROMETHAZINE HYDROCHLORIDE 25 MG/1
25 TABLET ORAL ONCE AS NEEDED
Status: DISCONTINUED | OUTPATIENT
Start: 2022-05-17 | End: 2022-05-17 | Stop reason: HOSPADM

## 2022-05-17 RX ORDER — IBUPROFEN 600 MG/1
600 TABLET ORAL EVERY 6 HOURS PRN
Qty: 60 TABLET | Refills: 2 | Status: SHIPPED | OUTPATIENT
Start: 2022-05-17

## 2022-05-17 RX ORDER — LIDOCAINE HYDROCHLORIDE 10 MG/ML
INJECTION, SOLUTION EPIDURAL; INFILTRATION; INTRACAUDAL; PERINEURAL AS NEEDED
Status: DISCONTINUED | OUTPATIENT
Start: 2022-05-17 | End: 2022-05-17 | Stop reason: SURG

## 2022-05-17 RX ORDER — KETOROLAC TROMETHAMINE 30 MG/ML
INJECTION, SOLUTION INTRAMUSCULAR; INTRAVENOUS AS NEEDED
Status: DISCONTINUED | OUTPATIENT
Start: 2022-05-17 | End: 2022-05-17 | Stop reason: SURG

## 2022-05-17 RX ORDER — ONDANSETRON 2 MG/ML
4 INJECTION INTRAMUSCULAR; INTRAVENOUS ONCE AS NEEDED
Status: DISCONTINUED | OUTPATIENT
Start: 2022-05-17 | End: 2022-05-17 | Stop reason: HOSPADM

## 2022-05-17 RX ORDER — SODIUM CHLORIDE 0.9 % (FLUSH) 0.9 %
3 SYRINGE (ML) INJECTION EVERY 12 HOURS SCHEDULED
Status: DISCONTINUED | OUTPATIENT
Start: 2022-05-17 | End: 2022-05-17 | Stop reason: HOSPADM

## 2022-05-17 RX ORDER — MEPERIDINE HYDROCHLORIDE 25 MG/ML
12.5 INJECTION INTRAMUSCULAR; INTRAVENOUS; SUBCUTANEOUS
Status: DISCONTINUED | OUTPATIENT
Start: 2022-05-17 | End: 2022-05-17 | Stop reason: HOSPADM

## 2022-05-17 RX ORDER — ACETAMINOPHEN 325 MG/1
650 TABLET ORAL ONCE AS NEEDED
Status: DISCONTINUED | OUTPATIENT
Start: 2022-05-17 | End: 2022-05-17 | Stop reason: HOSPADM

## 2022-05-17 RX ORDER — DEXAMETHASONE SODIUM PHOSPHATE 4 MG/ML
INJECTION, SOLUTION INTRA-ARTICULAR; INTRALESIONAL; INTRAMUSCULAR; INTRAVENOUS; SOFT TISSUE AS NEEDED
Status: DISCONTINUED | OUTPATIENT
Start: 2022-05-17 | End: 2022-05-17 | Stop reason: SURG

## 2022-05-17 RX ORDER — FENTANYL CITRATE 50 UG/ML
INJECTION, SOLUTION INTRAMUSCULAR; INTRAVENOUS AS NEEDED
Status: DISCONTINUED | OUTPATIENT
Start: 2022-05-17 | End: 2022-05-17 | Stop reason: SURG

## 2022-05-17 RX ORDER — NALOXONE HCL 0.4 MG/ML
0.4 VIAL (ML) INJECTION AS NEEDED
Status: DISCONTINUED | OUTPATIENT
Start: 2022-05-17 | End: 2022-05-17 | Stop reason: HOSPADM

## 2022-05-17 RX ORDER — PROPOFOL 10 MG/ML
VIAL (ML) INTRAVENOUS AS NEEDED
Status: DISCONTINUED | OUTPATIENT
Start: 2022-05-17 | End: 2022-05-17

## 2022-05-17 RX ORDER — EPHEDRINE SULFATE 50 MG/ML
5 INJECTION, SOLUTION INTRAVENOUS ONCE AS NEEDED
Status: DISCONTINUED | OUTPATIENT
Start: 2022-05-17 | End: 2022-05-17 | Stop reason: HOSPADM

## 2022-05-17 RX ORDER — ACETAMINOPHEN 650 MG/1
650 SUPPOSITORY RECTAL ONCE AS NEEDED
Status: DISCONTINUED | OUTPATIENT
Start: 2022-05-17 | End: 2022-05-17 | Stop reason: HOSPADM

## 2022-05-17 RX ORDER — PHENYLEPHRINE HYDROCHLORIDE 10 MG/ML
INJECTION INTRAVENOUS AS NEEDED
Status: DISCONTINUED | OUTPATIENT
Start: 2022-05-17 | End: 2022-05-17 | Stop reason: SURG

## 2022-05-17 RX ORDER — PROMETHAZINE HYDROCHLORIDE 25 MG/1
25 SUPPOSITORY RECTAL ONCE AS NEEDED
Status: DISCONTINUED | OUTPATIENT
Start: 2022-05-17 | End: 2022-05-17 | Stop reason: HOSPADM

## 2022-05-17 RX ORDER — SODIUM CHLORIDE 0.9 % (FLUSH) 0.9 %
10 SYRINGE (ML) INJECTION AS NEEDED
Status: DISCONTINUED | OUTPATIENT
Start: 2022-05-17 | End: 2022-05-17 | Stop reason: HOSPADM

## 2022-05-17 RX ORDER — DIPHENHYDRAMINE HYDROCHLORIDE 50 MG/ML
12.5 INJECTION INTRAMUSCULAR; INTRAVENOUS
Status: DISCONTINUED | OUTPATIENT
Start: 2022-05-17 | End: 2022-05-17 | Stop reason: HOSPADM

## 2022-05-17 RX ORDER — SODIUM CHLORIDE, SODIUM LACTATE, POTASSIUM CHLORIDE, CALCIUM CHLORIDE 600; 310; 30; 20 MG/100ML; MG/100ML; MG/100ML; MG/100ML
125 INJECTION, SOLUTION INTRAVENOUS CONTINUOUS
Status: DISCONTINUED | OUTPATIENT
Start: 2022-05-17 | End: 2022-05-17 | Stop reason: HOSPADM

## 2022-05-17 RX ORDER — PROPOFOL 10 MG/ML
VIAL (ML) INTRAVENOUS AS NEEDED
Status: DISCONTINUED | OUTPATIENT
Start: 2022-05-17 | End: 2022-05-17 | Stop reason: SURG

## 2022-05-17 RX ADMIN — ONDANSETRON 4 MG: 2 INJECTION INTRAMUSCULAR; INTRAVENOUS at 08:28

## 2022-05-17 RX ADMIN — MIDAZOLAM HYDROCHLORIDE 2 MG: 1 INJECTION, SOLUTION INTRAMUSCULAR; INTRAVENOUS at 08:28

## 2022-05-17 RX ADMIN — FENTANYL CITRATE 25 MCG: 50 INJECTION INTRAMUSCULAR; INTRAVENOUS at 08:28

## 2022-05-17 RX ADMIN — LIDOCAINE HYDROCHLORIDE 50 MG: 10 INJECTION, SOLUTION EPIDURAL; INFILTRATION; INTRACAUDAL; PERINEURAL at 08:54

## 2022-05-17 RX ADMIN — DEXAMETHASONE SODIUM PHOSPHATE 4 MG: 4 INJECTION, SOLUTION INTRAMUSCULAR; INTRAVENOUS at 08:28

## 2022-05-17 RX ADMIN — HYDROMORPHONE HYDROCHLORIDE 0.5 MG: 1 INJECTION, SOLUTION INTRAMUSCULAR; INTRAVENOUS; SUBCUTANEOUS at 09:36

## 2022-05-17 RX ADMIN — HYDROMORPHONE HYDROCHLORIDE 0.5 MG: 1 INJECTION, SOLUTION INTRAMUSCULAR; INTRAVENOUS; SUBCUTANEOUS at 09:27

## 2022-05-17 RX ADMIN — SODIUM CHLORIDE, POTASSIUM CHLORIDE, SODIUM LACTATE AND CALCIUM CHLORIDE 125 ML/HR: 600; 310; 30; 20 INJECTION, SOLUTION INTRAVENOUS at 06:42

## 2022-05-17 RX ADMIN — KETOROLAC TROMETHAMINE 30 MG: 30 INJECTION, SOLUTION INTRAMUSCULAR at 09:00

## 2022-05-17 RX ADMIN — PROPOFOL 150 MG: 10 INJECTION, EMULSION INTRAVENOUS at 08:33

## 2022-05-17 RX ADMIN — PHENYLEPHRINE HYDROCHLORIDE 100 MCG: 10 INJECTION, SOLUTION INTRAVENOUS at 08:45

## 2022-05-17 NOTE — ANESTHESIA PROCEDURE NOTES
Airway  Urgency: elective    Date/Time: 5/17/2022 8:36 AM  Airway not difficult    General Information and Staff    Patient location during procedure: OR  CRNA/CAA: Laureano Camacho CRNA  SRNA: Lorena Daley SRNA  Indications and Patient Condition  Indications for airway management: airway protection    Preoxygenated: yes  MILS maintained throughout  Mask difficulty assessment: 1 - vent by mask    Final Airway Details  Final airway type: supraglottic airway      Successful airway: classic  Size 4  Cuff Pressure (cm H2O): 12    Number of attempts at approach: 1  Assessment: lips, teeth, and gum same as pre-op

## 2022-05-17 NOTE — ANESTHESIA POSTPROCEDURE EVALUATION
Patient: Christiana Aguirre    Procedure Summary     Date: 05/17/22 Room / Location: [facility] OR [address] [place] [address] OR    Anesthesia Start: 0829 Anesthesia Stop: 0909    Procedure: DILATATION AND CURETTAGE HYSTEROSCOPY WITH SAI ENDOMETRIAL ABLATION (N/A Vagina) Diagnosis:       Abnormal uterine bleeding (AUB)      Pelvic pain      Intermenstrual bleeding      Adenomyosis      (Abnormal uterine bleeding (AUB) [N93.9])      (Pelvic pain [R10.2])      (Intermenstrual bleeding [N92.3])      (Adenomyosis [N80.0])    Surgeons: Van Cordova DO Provider: Alvaerz Jeffery MD    Anesthesia Type: general ASA Status: 3          Anesthesia Type: general    Vitals  No vitals data found for the desired time range.          Post Anesthesia Care and Evaluation    Patient location during evaluation: PACU  Patient participation: complete - patient participated  Level of consciousness: awake  Pain score: 0  Pain management: adequate  Airway patency: patent  Anesthetic complications: No anesthetic complications  PONV Status: none  Cardiovascular status: acceptable and hemodynamically stable  Respiratory status: acceptable, face mask and spontaneous ventilation  Hydration status: acceptable    Comments: ---------------------------               05/17/22                      0633         ---------------------------   BP:          104/57         Pulse:         77           Resp:          16           Temp:   96.7 °F (35.9 °C)   SpO2:          98%         ---------------------------

## 2022-05-17 NOTE — OP NOTE
OPERATIVE NOTE  Christiana Aguirre  1990 5/17/2022    PREOP DIAGNOSES:  Abnormal uterine bleeding (AUB) [N93.9]  Pelvic pain [R10.2]  Intermenstrual bleeding [N92.3]  Adenomyosis [N80.0]    POSTOP DIAGNOSES:  Post-Op Diagnosis Codes:     * Abnormal uterine bleeding (AUB) [N93.9]     * Pelvic pain [R10.2]     * Intermenstrual bleeding [N92.3]     * Adenomyosis [N80.0]    Procedure(s):  DILATATION AND CURETTAGE HYSTEROSCOPY WITH SAI ENDOMETRIAL ABLATION    SURGEON: Van Cordova DO, FACOG    Assistant: Marcella Diaz CSA was responsible for performing the following activities: Retraction and postitioning patient, cleaning patient and their skilled assistance was necessary for the success of this case.     STAFF:   Circulator: Kenya Cano RN  Scrub Person: Cookie Gann  Assistant: Marcella Diaz CSA    ANESTHESIA: General    ANESTHESIA STAFF:  Anesthesiologist: Alvarez Jeffery MD  CRNA: Laureano Camacho CRNA  Student Nurse Anesthetist: Lorena Daley SRNA    ESTIMATED BLOOD LOSS: 5 ml     SPECIMEN:   ID Type Source Tests Collected by Time   A : endometrial curettings Tissue Endometrial Curettings TISSUE EXAM, [facility]) Van Cordova DO 5/17/2022 0854       FINDINGS: Normal-appearing vagina and cervix, normal-appearing uterine cavity, both ostia visualized.    COMPLICATIONS: None    DESCRIPTION OF OPERATION: After obtaining informed consent the patient was taken the operating room where general endotracheal anesthesia was found to be adequate she was then prepped and draped in the normal sterile fashion in the low lithotomy position.  A final timeout was performed.  Attention was turned to the vagina.  A weighted speculum was placed along with a Brady retractor.  The cervix was visualized and the anterior lip of the cervix grasped with an Allis clamp.  The cervix was then sounded to 3 cm and the uterus to 8 cm.  The cervix was then sequentially dilated to 16 Malian.  A  hysteroscopy was performed with findings as noted above no significant abnormalities noted within the cavity.  A gentle sharp curettage was then done.  When this was complete the Raine endometrial ablation device was introduced to the fundus and the device was deployed without difficulty.  A burn for 120 seconds was completed.  Patient tolerated the procedure well.  All instruments were removed and a vaginal sweep was performed with nothing noted in the vagina, no cervical bleeding was noted.  Patient tolerated the procedure well sponge lap and needle count were correct x2.  The patient was taken to the recovery room in stable condition.          This document has been electronically signed by Van Cordova DO on May 17, 2022 09:03 CDT

## 2022-05-17 NOTE — ANESTHESIA PREPROCEDURE EVALUATION
" Anesthesia Evaluation     Patient summary reviewed and Nursing notes reviewed   no history of anesthetic complications:  NPO Solid Status: > 8 hours  NPO Liquid Status: > 8 hours           Airway   Mallampati: II  TM distance: >3 FB  Neck ROM: full  possible difficult intubation  Dental    (+) lower dentures and upper dentures    Pulmonary     breath sounds clear to auscultation  (+) a smoker Current Abstained day of surgery, recent URI (Finished antibiotics for \"sinus\" infection Sunday 5/16/22 ) resolved,   (-) shortness of breath  Cardiovascular - negative cardio ROS    Rhythm: regular  Rate: normal    (-) OSUNA, murmur      Neuro/Psych  (+) numbness (History of right sciatica.), psychiatric history Anxiety,    GI/Hepatic/Renal/Endo - negative ROS   (-) GERD    Musculoskeletal (-) negative ROS    Abdominal    Substance History - negative use     OB/GYN negative ob/gyn ROS   (-)  Pregnant        Other - negative ROS                       Anesthesia Plan    ASA 3     general     intravenous induction     Anesthetic plan, all risks, benefits, and alternatives have been provided, discussed and informed consent has been obtained with: patient.        CODE STATUS:       "

## 2022-05-19 LAB — REF LAB TEST METHOD: NORMAL

## 2022-06-08 ENCOUNTER — OFFICE VISIT (OUTPATIENT)
Dept: FAMILY MEDICINE CLINIC | Facility: CLINIC | Age: 32
End: 2022-06-08

## 2022-06-08 ENCOUNTER — OFFICE VISIT (OUTPATIENT)
Dept: OBSTETRICS AND GYNECOLOGY | Facility: CLINIC | Age: 32
End: 2022-06-08

## 2022-06-08 VITALS
WEIGHT: 95 LBS | BODY MASS INDEX: 18.65 KG/M2 | SYSTOLIC BLOOD PRESSURE: 92 MMHG | HEIGHT: 60 IN | DIASTOLIC BLOOD PRESSURE: 60 MMHG

## 2022-06-08 VITALS
HEART RATE: 83 BPM | OXYGEN SATURATION: 98 % | HEIGHT: 60 IN | BODY MASS INDEX: 18.53 KG/M2 | DIASTOLIC BLOOD PRESSURE: 62 MMHG | SYSTOLIC BLOOD PRESSURE: 102 MMHG | WEIGHT: 94.38 LBS

## 2022-06-08 DIAGNOSIS — H91.92 HEARING LOSS OF LEFT EAR, UNSPECIFIED HEARING LOSS TYPE: Primary | ICD-10-CM

## 2022-06-08 DIAGNOSIS — R41.840 ATTENTION DEFICIT: ICD-10-CM

## 2022-06-08 DIAGNOSIS — Z09 POSTOPERATIVE FOLLOW-UP: Primary | ICD-10-CM

## 2022-06-08 DIAGNOSIS — F41.9 ANXIETY: Chronic | ICD-10-CM

## 2022-06-08 PROCEDURE — 99212 OFFICE O/P EST SF 10 MIN: CPT | Performed by: OBSTETRICS & GYNECOLOGY

## 2022-06-08 PROCEDURE — 99214 OFFICE O/P EST MOD 30 MIN: CPT | Performed by: FAMILY MEDICINE

## 2022-06-08 RX ORDER — HYDROXYZINE PAMOATE 25 MG/1
25 CAPSULE ORAL 3 TIMES DAILY PRN
Qty: 90 CAPSULE | Refills: 2 | Status: CANCELLED | OUTPATIENT
Start: 2022-06-08

## 2022-06-08 NOTE — PROGRESS NOTES
Subjective   Christiana Aguirre is a 31 y.o. female.   Cc: follow up of chronic medical issues    Anxiety  Presents for follow-up visit. Symptoms include decreased concentration, depressed mood, excessive worry, insomnia, irritability, muscle tension, nervous/anxious behavior, obsessions and restlessness. Patient reports no chest pain, compulsions, confusion, dizziness, feeling of choking, hyperventilation, nausea, palpitations, shortness of breath or suicidal ideas.        The patient missed her appointment with Dr. Watson. Her hearing is coming back.She also is concerned with her attention. She is noticing that she can't concentrate as well.  The following portions of the patient's history were reviewed and updated as appropriate: allergies, current medications, past family history, past medical history, past social history, past surgical history and problem list.    Review of Systems   Constitutional: Positive for irritability. Negative for fatigue and fever.   Respiratory: Negative for cough and shortness of breath.    Cardiovascular: Negative for chest pain and palpitations.   Gastrointestinal: Negative for nausea.   Neurological: Negative for dizziness.   Psychiatric/Behavioral: Positive for decreased concentration. Negative for confusion and suicidal ideas. The patient is nervous/anxious and has insomnia.        Objective   Physical Exam  Vitals reviewed.   Constitutional:       Appearance: Normal appearance.   HENT:      Head: Normocephalic and atraumatic.      Right Ear: Tympanic membrane, ear canal and external ear normal.      Left Ear: Tympanic membrane, ear canal and external ear normal.      Nose: Nose normal.      Mouth/Throat:      Mouth: Mucous membranes are moist.      Pharynx: Oropharynx is clear.   Cardiovascular:      Rate and Rhythm: Normal rate and regular rhythm.      Heart sounds: Normal heart sounds. No murmur heard.    No gallop.   Pulmonary:      Effort: Pulmonary effort is normal. No  "respiratory distress.      Breath sounds: Normal breath sounds. No stridor. No wheezing, rhonchi or rales.   Chest:      Chest wall: No tenderness.   Abdominal:      General: Bowel sounds are normal. There is no distension.      Palpations: Abdomen is soft. There is no mass.      Tenderness: There is no abdominal tenderness. There is no guarding or rebound.      Hernia: No hernia is present.   Musculoskeletal:      Cervical back: Normal range of motion.   Skin:     General: Skin is warm and dry.   Neurological:      Mental Status: She is alert.           Visit Vitals  /62   Pulse 83   Ht 152.4 cm (60\")   Wt 42.8 kg (94 lb 6 oz)   LMP 05/17/2022 (Exact Date) Comment: 05/17/22 Ablation   SpO2 98%   Breastfeeding No   BMI 18.43 kg/m²     Body mass index is 18.43 kg/m².      Assessment/Plan   Diagnoses and all orders for this visit:    1. Hearing loss of left ear, unspecified hearing loss type (Primary)    2. Anxiety    3. Attention deficit  -     Ambulatory Referral to Psychology    She doesn't want to take Atarax. She feels that her Anxiety is doing a little better.  Will refer for testing for ADHD.  "

## 2022-06-08 NOTE — PROGRESS NOTES
Chief complaint: Postoperative follow-up    Patient presents for postop follow-up status post endometrial ablation on 5/17/2022.  Patient is overall doing well and without major complaints at this time.  She is ambulating without difficulty, tolerating a regular diet.  She is urinating without difficulty.  Pain is well controlled with pain medications at this time.  No questions or concerns at this time.    Vital signs reviewed  No acute distress  Awake and oriented x3      Patient is status post endometrial ablation doing well and recovering appropriately at this time.  Patient is meeting appropriate milestones at this time.  Patient to return as needed.

## (undated) DEVICE — PREP SOL POVIDONE/IODINE BT 4OZ

## (undated) DEVICE — STERILE POLYISOPRENE POWDER-FREE SURGICAL GLOVES WITH EMOLLIENT COATING: Brand: PROTEXIS

## (undated) DEVICE — PREP PVP-I 7.5P BT 4OZ

## (undated) DEVICE — STERILE MINERVA DISPOSABLE HANDPIECECONTENTS:(1) ONE SINGLE USE STERILE MINERVA ES DISPOSABLE HANDPIECE (1) ONE SINGLE USE STERILE SYRINGE(1) ONE SINGLE USE STERILE 8MM HEGAR DILATOR(1) ONE SINGLE USE NON-STERILE DESICCANT(1) ONE NON-STERILE HANDPIECE INSTRUCTIONS FOR USE(1)  ONE NON-STERILE DILATOR INSTRUCTIONS FOR USE: Brand: MINERVA SINGLE STERILE DISPOSABLE HANDPIECE

## (undated) DEVICE — PK D AND C 60

## (undated) DEVICE — DRSNG TELFA PAD NONADH STR 1S 3X8IN

## (undated) DEVICE — CATHETER,URETHRAL,REDRUBBER,STRL,16FR: Brand: MEDLINE

## (undated) DEVICE — SOL NACL 0.9PCT 1000ML

## (undated) DEVICE — TUBING, SUCTION, 3/16" X 6', STRAIGHT: Brand: MEDLINE

## (undated) DEVICE — GLV SURG DERMASSURE GRN LF PF SZ 6.5

## (undated) DEVICE — CP SLF SEAL HYSTERSCOPE 2MM/HL

## (undated) DEVICE — DRAPE UNDERBUTTOCKS W FLUID POUCH 40X44IN

## (undated) DEVICE — GOWN,PREVENTION PLUS,XLNG/XXLARGE,STRL: Brand: MEDLINE

## (undated) DEVICE — CYSTO/BLADDER IRRIGATION SET, REGULATING CLAMP